# Patient Record
Sex: FEMALE | Race: BLACK OR AFRICAN AMERICAN | NOT HISPANIC OR LATINO | Employment: OTHER | URBAN - METROPOLITAN AREA
[De-identification: names, ages, dates, MRNs, and addresses within clinical notes are randomized per-mention and may not be internally consistent; named-entity substitution may affect disease eponyms.]

---

## 2023-01-01 ENCOUNTER — NURSING HOME VISIT (OUTPATIENT)
Dept: POST ACUTE CARE | Facility: EXTERNAL LOCATION | Age: 83
End: 2023-01-01
Payer: MEDICARE

## 2023-01-01 ENCOUNTER — HOSPITAL ENCOUNTER (EMERGENCY)
Facility: HOSPITAL | Age: 83
End: 2023-10-04
Attending: STUDENT IN AN ORGANIZED HEALTH CARE EDUCATION/TRAINING PROGRAM | Admitting: EMERGENCY MEDICINE
Payer: MEDICARE

## 2023-01-01 ENCOUNTER — DOCUMENTATION (OUTPATIENT)
Dept: POST ACUTE CARE | Facility: EXTERNAL LOCATION | Age: 83
End: 2023-01-01
Payer: MEDICARE

## 2023-01-01 VITALS
WEIGHT: 180 LBS | TEMPERATURE: 96.4 F | SYSTOLIC BLOOD PRESSURE: 106 MMHG | HEART RATE: 66 BPM | BODY MASS INDEX: 29.99 KG/M2 | HEIGHT: 65 IN | DIASTOLIC BLOOD PRESSURE: 80 MMHG | RESPIRATION RATE: 20 BRPM

## 2023-01-01 DIAGNOSIS — M72.6 NECROTIZING FASCIITIS (MULTI): ICD-10-CM

## 2023-01-01 DIAGNOSIS — N18.6 END STAGE RENAL DISEASE (MULTI): ICD-10-CM

## 2023-01-01 DIAGNOSIS — R53.1 WEAKNESS: Primary | ICD-10-CM

## 2023-01-01 DIAGNOSIS — R53.1 WEAKNESS: ICD-10-CM

## 2023-01-01 DIAGNOSIS — I15.9 SECONDARY HYPERTENSION: ICD-10-CM

## 2023-01-01 DIAGNOSIS — I10 HYPERTENSION, ESSENTIAL: ICD-10-CM

## 2023-01-01 DIAGNOSIS — D63.1 ANEMIA DUE TO CHRONIC KIDNEY DISEASE, UNSPECIFIED CKD STAGE: ICD-10-CM

## 2023-01-01 DIAGNOSIS — I46.9 CARDIAC ARREST (MULTI): Primary | ICD-10-CM

## 2023-01-01 DIAGNOSIS — F03.90 DEMENTIA, UNSPECIFIED DEMENTIA SEVERITY, UNSPECIFIED DEMENTIA TYPE, UNSPECIFIED WHETHER BEHAVIORAL, PSYCHOTIC, OR MOOD DISTURBANCE OR ANXIETY (MULTI): ICD-10-CM

## 2023-01-01 DIAGNOSIS — N18.6 END STAGE RENAL DISEASE (MULTI): Primary | ICD-10-CM

## 2023-01-01 DIAGNOSIS — N18.9 ANEMIA DUE TO CHRONIC KIDNEY DISEASE, UNSPECIFIED CKD STAGE: ICD-10-CM

## 2023-01-01 DIAGNOSIS — D63.1 ANEMIA DUE TO STAGE 3B CHRONIC KIDNEY DISEASE (MULTI): ICD-10-CM

## 2023-01-01 DIAGNOSIS — G93.41 METABOLIC ENCEPHALOPATHY: ICD-10-CM

## 2023-01-01 DIAGNOSIS — I15.9 SECONDARY HYPERTENSION: Primary | ICD-10-CM

## 2023-01-01 DIAGNOSIS — E46 MALNUTRITION, UNSPECIFIED TYPE (MULTI): ICD-10-CM

## 2023-01-01 DIAGNOSIS — N18.32 ANEMIA DUE TO STAGE 3B CHRONIC KIDNEY DISEASE (MULTI): ICD-10-CM

## 2023-01-01 DIAGNOSIS — M72.6 NECROTIZING FASCIITIS (MULTI): Primary | ICD-10-CM

## 2023-01-01 PROCEDURE — 96376 TX/PRO/DX INJ SAME DRUG ADON: CPT | Mod: XS | Performed by: EMERGENCY MEDICINE

## 2023-01-01 PROCEDURE — 99284 EMERGENCY DEPT VISIT MOD MDM: CPT | Performed by: STUDENT IN AN ORGANIZED HEALTH CARE EDUCATION/TRAINING PROGRAM

## 2023-01-01 PROCEDURE — 99308 SBSQ NF CARE LOW MDM 20: CPT | Performed by: INTERNAL MEDICINE

## 2023-01-01 PROCEDURE — 99305 1ST NF CARE MODERATE MDM 35: CPT | Performed by: INTERNAL MEDICINE

## 2023-01-01 PROCEDURE — 99308 SBSQ NF CARE LOW MDM 20: CPT | Performed by: NURSE PRACTITIONER

## 2023-01-01 PROCEDURE — 96374 THER/PROPH/DIAG INJ IV PUSH: CPT | Mod: XS | Performed by: EMERGENCY MEDICINE

## 2023-01-01 PROCEDURE — 31500 INSERT EMERGENCY AIRWAY: CPT | Performed by: STUDENT IN AN ORGANIZED HEALTH CARE EDUCATION/TRAINING PROGRAM

## 2023-01-01 PROCEDURE — 96375 TX/PRO/DX INJ NEW DRUG ADDON: CPT | Mod: XS | Performed by: EMERGENCY MEDICINE

## 2023-01-01 PROCEDURE — 99309 SBSQ NF CARE MODERATE MDM 30: CPT | Performed by: INTERNAL MEDICINE

## 2023-01-01 PROCEDURE — 2500000004 HC RX 250 GENERAL PHARMACY W/ HCPCS (ALT 636 FOR OP/ED): Performed by: STUDENT IN AN ORGANIZED HEALTH CARE EDUCATION/TRAINING PROGRAM

## 2023-01-01 PROCEDURE — 2500000005 HC RX 250 GENERAL PHARMACY W/O HCPCS: Performed by: STUDENT IN AN ORGANIZED HEALTH CARE EDUCATION/TRAINING PROGRAM

## 2023-01-01 PROCEDURE — 99285 EMERGENCY DEPT VISIT HI MDM: CPT | Mod: 25 | Performed by: EMERGENCY MEDICINE

## 2023-01-01 RX ORDER — EPINEPHRINE 0.1 MG/ML
INJECTION INTRACARDIAC; INTRAVENOUS CODE/TRAUMA/SEDATION MEDICATION
Status: COMPLETED | OUTPATIENT
Start: 2023-01-01 | End: 2023-01-01

## 2023-01-01 RX ORDER — INDOMETHACIN 25 MG/1
CAPSULE ORAL
Status: COMPLETED | OUTPATIENT
Start: 2023-01-01 | End: 2023-01-01

## 2023-01-01 RX ORDER — CALCIUM CHLORIDE INJECTION 100 MG/ML
INJECTION, SOLUTION INTRAVENOUS CODE/TRAUMA/SEDATION MEDICATION
Status: COMPLETED | OUTPATIENT
Start: 2023-01-01 | End: 2023-01-01

## 2023-01-01 RX ORDER — CALCIUM CHLORIDE INJECTION 100 MG/ML
INJECTION, SOLUTION INTRAVENOUS
Status: DISCONTINUED
Start: 2023-01-01 | End: 2023-01-01 | Stop reason: HOSPADM

## 2023-01-01 RX ADMIN — EPINEPHRINE 1 MG: 0.1 INJECTION INTRACARDIAC; INTRAVENOUS at 10:58

## 2023-01-01 RX ADMIN — EPINEPHRINE 1 MG: 0.1 INJECTION INTRACARDIAC; INTRAVENOUS at 11:01

## 2023-01-01 RX ADMIN — EPINEPHRINE 1 MG: 0.1 INJECTION INTRACARDIAC; INTRAVENOUS at 11:04

## 2023-01-01 RX ADMIN — CALCIUM CHLORIDE 1 G: 100 INJECTION INTRAVENOUS; INTRAVENTRICULAR at 11:03

## 2023-01-01 RX ADMIN — CALCIUM CHLORIDE 1 G: 100 INJECTION INTRAVENOUS; INTRAVENTRICULAR at 10:56

## 2023-01-01 RX ADMIN — SODIUM BICARBONATE 50 MEQ: 84 INJECTION, SOLUTION INTRAVENOUS at 11:03

## 2023-01-01 RX ADMIN — EPINEPHRINE 1 MG: 0.1 INJECTION INTRACARDIAC; INTRAVENOUS at 10:56

## 2023-01-01 ASSESSMENT — LIFESTYLE VARIABLES
EVER HAD A DRINK FIRST THING IN THE MORNING TO STEADY YOUR NERVES TO GET RID OF A HANGOVER: NO
EVER FELT BAD OR GUILTY ABOUT YOUR DRINKING: NO
HAVE YOU EVER FELT YOU SHOULD CUT DOWN ON YOUR DRINKING: NO
HAVE PEOPLE ANNOYED YOU BY CRITICIZING YOUR DRINKING: NO

## 2023-06-19 NOTE — LETTER
Patient: Afua Hilario  : 1940    Encounter Date: 2023    PROGRESS NOTE    Subjective  Chief complaint: Afua Hilario is a 83 y.o. female who is an acute skilled patient being seen and evaluated for weakness    HPI:  2023 patient admitted to skilled nursing facility for therapy and medical management after recent hospitalization.  She had presented to the ED with complaint of abdominal pain and gluteal pain.  Imaging concerning for necrotizing fasciitis.  Patient admitted to hospital and underwent debridement.  Fecal management system placed and patient started on IV antibiotics.  She was seen by infectious disease and transition to Augmentin which she will continue until 2023.      Objective  Vital signs: 123/64, 97.8, 93, 16, 97%    Physical Exam  Constitutional:       General: She is not in acute distress.  HENT:      Nose:      Comments: Corpak  Eyes:      Extraocular Movements: Extraocular movements intact.   Cardiovascular:      Rate and Rhythm: Normal rate and regular rhythm.      Arteriovenous access: Right arteriovenous access is present.     Comments: Right upper extremity AV fistula positive thrill positive bruit  Pulmonary:      Effort: Pulmonary effort is normal.      Breath sounds: Normal breath sounds.   Abdominal:      General: Bowel sounds are normal.      Palpations: Abdomen is soft.   Genitourinary:     Comments: Fecal management system intact  Musculoskeletal:      Cervical back: Neck supple.      Right lower leg: Edema present.      Left lower leg: Edema present.      Comments: Generalized weakness   Skin:     Comments: Wound VAC intact to peritoneal area  Dialysis port to the right chest   Neurological:      Mental Status: She is alert.   Psychiatric:         Mood and Affect: Mood normal.         Behavior: Behavior is cooperative.         Assessment/Plan  Problem List Items Addressed This Visit       End stage renal disease (CMS/[facility])     HD per nephrology  Renal  diet         Necrotizing fasciitis (CMS/HCC)     Antibiotics complete  Wound care per wound doctor         Secondary hypertension     Controlled  Continue antihypertensives  Monitor BP         Weakness - Primary     Therapy          Medications, treatments, and labs reviewed  Continue medications and treatments as listed in EMR    JOHN Garcia      Electronically Signed By: JOHN Garcia   6/25/23 12:36 PM

## 2023-06-19 NOTE — PROGRESS NOTES
PROGRESS NOTE    Subjective   Chief complaint: Afua Hilario is a 83 y.o. female who is an acute skilled patient being seen and evaluated for weakness    HPI:  6/19/2023 patient admitted to skilled nursing facility for therapy and medical management after recent hospitalization.  She had presented to the ED with complaint of abdominal pain and gluteal pain.  Imaging concerning for necrotizing fasciitis.  Patient admitted to hospital and underwent debridement.  Fecal management system placed and patient started on IV antibiotics.  She was seen by infectious disease and transition to Augmentin which she will continue until 6/16/2023.      Objective   Vital signs: 123/64, 97.8, 93, 16, 97%    Physical Exam  Constitutional:       General: She is not in acute distress.  HENT:      Nose:      Comments: Corpak  Eyes:      Extraocular Movements: Extraocular movements intact.   Cardiovascular:      Rate and Rhythm: Normal rate and regular rhythm.      Arteriovenous access: Right arteriovenous access is present.     Comments: Right upper extremity AV fistula positive thrill positive bruit  Pulmonary:      Effort: Pulmonary effort is normal.      Breath sounds: Normal breath sounds.   Abdominal:      General: Bowel sounds are normal.      Palpations: Abdomen is soft.   Genitourinary:     Comments: Fecal management system intact  Musculoskeletal:      Cervical back: Neck supple.      Right lower leg: Edema present.      Left lower leg: Edema present.      Comments: Generalized weakness   Skin:     Comments: Wound VAC intact to peritoneal area  Dialysis port to the right chest   Neurological:      Mental Status: She is alert.   Psychiatric:         Mood and Affect: Mood normal.         Behavior: Behavior is cooperative.         Assessment/Plan   Problem List Items Addressed This Visit       End stage renal disease (CMS/HCC)     HD per nephrology  Renal diet         Necrotizing fasciitis (CMS/HCC)     Antibiotics  complete  Wound care per wound doctor         Secondary hypertension     Controlled  Continue antihypertensives  Monitor BP         Weakness - Primary     Therapy          Medications, treatments, and labs reviewed  Continue medications and treatments as listed in EMR    Heather Sierra, APRN-CNP

## 2023-06-20 NOTE — LETTER
Patient: Afua Hilario  : 1940    Encounter Date: 2023    HISTORY & PHYSICAL    Subjective  Chief complaint: Afua Hilario is a 83 y.o. female who is a acute skilled care patient being seen and evaluated for multiple medical problems.  Patient presents for weakness.    HPI:  2023 patient admitted to skilled nursing facility for therapy and medical management after recent hospitalization.  She had presented to the ED with complaint of abdominal pain and gluteal pain.  Imaging concerning for necrotizing fasciitis.  Patient admitted to hospital and underwent debridement.  Fecal management system placed and patient started on IV antibiotics.  She was seen by infectious disease and transition to Augmentin which she will continue until 2023.  Patient end-stage renal disease on hemodialysis        Past Medical History:   Diagnosis Date   • Anemia    • ESRD (end stage renal disease) (CMS/[facility])    • GERD (gastroesophageal reflux disease)    • Hypertension, essential    • Malnutrition (CMS/[facility])    • RA (rheumatoid arthritis) (CMS/[facility])        No past surgical history on file.    Family History   Problem Relation Name Age of Onset   • No Known Problems Mother     • No Known Problems Father         Social History     Socioeconomic History   • Marital status:      Spouse name: Not on file   • Number of children: Not on file   • Years of education: Not on file   • Highest education level: Not on file   Occupational History   • Not on file   Tobacco Use   • Smoking status: Not on file   • Smokeless tobacco: Not on file   Substance and Sexual Activity   • Alcohol use: Not on file   • Drug use: Not on file   • Sexual activity: Not on file   Other Topics Concern   • Not on file   Social History Narrative   • Not on file     Social Determinants of Health     Financial Resource Strain: Not on file   Food Insecurity: Not on file   Transportation Needs: Not on file   Physical Activity: Not on file    Stress: Not on file   Social Connections: Not on file   Intimate Partner Violence: Not on file   Housing Stability: Not on file       Vital signs: 128/64, 97.5, 71, 16, 97%    Objective  Physical Exam  Vitals reviewed.   Constitutional:       Appearance: Normal appearance.   HENT:      Head: Normocephalic and atraumatic.   Cardiovascular:      Rate and Rhythm: Normal rate and regular rhythm.   Pulmonary:      Effort: Pulmonary effort is normal.      Breath sounds: Normal breath sounds.   Abdominal:      General: Bowel sounds are normal.      Palpations: Abdomen is soft.      Comments: Patient had a wound VAC to the wound in the.  Area where she had fasciitis and necrotizing fasciitis.  She has a fecal system management.   Musculoskeletal:      Cervical back: Neck supple.   Skin:     General: Skin is warm and dry.   Neurological:      General: No focal deficit present.      Mental Status: She is alert.   Psychiatric:         Mood and Affect: Mood normal.         Behavior: Behavior is cooperative.     Anemia    Assessment/Plan  Problem List Items Addressed This Visit    None    Medications, treatments, and labs reviewed  Continue medications and treatments as listed in [facility]    Scribe Attestation  I, Melissa Barajas   attest that this documentation has been prepared under the direction and in the presence of Sridevi Mitchell MD.    Provider Attestation - Scribe documentation  All medical record entries made by the Scribe were at my direction and personally dictated by me. I have reviewed the chart and agree that the record accurately reflects my personal performance of the history, physical exam, discussion and plan.    Sridevi Mitchell MD          Electronically Signed By: Sridevi Mitchell MD   6/21/23  7:44 PM

## 2023-06-21 PROBLEM — N18.9 ANEMIA DUE TO CHRONIC KIDNEY DISEASE: Status: ACTIVE | Noted: 2023-01-01

## 2023-06-21 PROBLEM — D63.1 ANEMIA DUE TO CHRONIC KIDNEY DISEASE: Status: ACTIVE | Noted: 2023-01-01

## 2023-06-21 PROBLEM — R53.1 WEAKNESS: Status: ACTIVE | Noted: 2023-01-01

## 2023-06-21 PROBLEM — I15.9 SECONDARY HYPERTENSION: Status: ACTIVE | Noted: 2023-01-01

## 2023-06-21 PROBLEM — N18.6 END STAGE RENAL DISEASE (MULTI): Status: ACTIVE | Noted: 2023-01-01

## 2023-06-21 PROBLEM — M72.6 NECROTIZING FASCIITIS (MULTI): Status: ACTIVE | Noted: 2023-01-01

## 2023-06-21 NOTE — LETTER
Patient: Afau Hilario  : 1940    Encounter Date: 2023    PROGRESS NOTE    Subjective  Chief complaint: Afua Hilario is a 83 y.o. female who is an acute skilled patient being seen and evaluated for weakness    HPI:  2023 patient admitted to skilled nursing facility for therapy and medical management after recent hospitalization.  She had presented to the ED with complaint of abdominal pain and gluteal pain.  Imaging concerning for necrotizing fasciitis.  Patient admitted to hospital and underwent debridement.  Fecal management system placed and patient started on IV antibiotics.  She was seen by infectious disease and transition to Augmentin which she will continue until 2023.    2023 Patient at skilled nursing [facility] for therapy.  Patient has been actively participating in therapy and continues to work toward goals.  Patient requires minimal to moderate assist for sit to stand, supine to sit, and stand pivot transfers.  Patient has no new concerns today.  Feeling OK.  Denies n/v/f/c.  No new concerns per nursing staff.         Objective  Vital signs: 128/64, 97.5, 71, 16, 97%    Physical Exam  Constitutional:       General: She is not in acute distress.  Eyes:      Extraocular Movements: Extraocular movements intact.   Cardiovascular:      Rate and Rhythm: Normal rate and regular rhythm.      Arteriovenous access: Right arteriovenous access is present.     Comments: Right upper extremity AV fistula positive thrill positive bruit  Pulmonary:      Effort: Pulmonary effort is normal.      Breath sounds: Normal breath sounds.   Abdominal:      General: Bowel sounds are normal.      Palpations: Abdomen is soft.   Genitourinary:     Comments: Fecal management system intact  Musculoskeletal:      Cervical back: Neck supple.      Right lower leg: Edema present.      Left lower leg: Edema present.      Comments: Generalized weakness   Skin:     Comments: Wound VAC intact to peritoneal  area  Dialysis port to the right chest   Neurological:      Mental Status: She is alert.   Psychiatric:         Mood and Affect: Mood normal.         Behavior: Behavior is cooperative.         Assessment/Plan  Problem List Items Addressed This Visit       End stage renal disease (CMS/HCC)     HD per nephrology  Renal diet         Necrotizing fasciitis (CMS/[facility])     Antibiotics complete  Wound care per wound doctor         Secondary hypertension     Controlled  Continue antihypertensives  Monitor BP         Weakness - Primary     Continue with therapy          Medications, treatments, and labs reviewed  Continue medications and treatments as listed in EMR    JOHN Garcia      Electronically Signed By: JOHN Garcia   6/25/23 12:45 PM

## 2023-06-21 NOTE — PROGRESS NOTES
HISTORY & PHYSICAL    Subjective   Chief complaint: Afua Hilario is a 83 y.o. female who is a acute skilled care patient being seen and evaluated for multiple medical problems.  Patient presents for weakness.    HPI:  6/19/2023 patient admitted to skilled nursing facility for therapy and medical management after recent hospitalization.  She had presented to the ED with complaint of abdominal pain and gluteal pain.  Imaging concerning for necrotizing fasciitis.  Patient admitted to hospital and underwent debridement.  Fecal management system placed and patient started on IV antibiotics.  She was seen by infectious disease and transition to Augmentin which she will continue until 6/16/2023.  Patient end-stage renal disease on hemodialysis        Past Medical History:   Diagnosis Date    Anemia     ESRD (end stage renal disease) (CMS/[facility])     GERD (gastroesophageal reflux disease)     Hypertension, essential     Malnutrition (CMS/[facility])     RA (rheumatoid arthritis) (CMS/[facility])        No past surgical history on file.    Family History   Problem Relation Name Age of Onset    No Known Problems Mother      No Known Problems Father         Social History     Socioeconomic History    Marital status:      Spouse name: Not on file    Number of children: Not on file    Years of education: Not on file    Highest education level: Not on file   Occupational History    Not on file   Tobacco Use    Smoking status: Not on file    Smokeless tobacco: Not on file   Substance and Sexual Activity    Alcohol use: Not on file    Drug use: Not on file    Sexual activity: Not on file   Other Topics Concern    Not on file   Social History Narrative    Not on file     Social Determinants of Health     Financial Resource Strain: Not on file   Food Insecurity: Not on file   Transportation Needs: Not on file   Physical Activity: Not on file   Stress: Not on file   Social Connections: Not on file   Intimate Partner Violence: Not on file    Housing Stability: Not on file       Vital signs: 128/64, 97.5, 71, 16, 97%    Objective   Physical Exam  Vitals reviewed.   Constitutional:       Appearance: Normal appearance.   HENT:      Head: Normocephalic and atraumatic.   Cardiovascular:      Rate and Rhythm: Normal rate and regular rhythm.   Pulmonary:      Effort: Pulmonary effort is normal.      Breath sounds: Normal breath sounds.   Abdominal:      General: Bowel sounds are normal.      Palpations: Abdomen is soft.      Comments: Patient had a wound VAC to the wound in the.  Area where she had fasciitis and necrotizing fasciitis.  She has a fecal system management.   Musculoskeletal:      Cervical back: Neck supple.   Skin:     General: Skin is warm and dry.   Neurological:      General: No focal deficit present.      Mental Status: She is alert.   Psychiatric:         Mood and Affect: Mood normal.         Behavior: Behavior is cooperative.     Anemia    Assessment/Plan   Problem List Items Addressed This Visit    None    Medications, treatments, and labs reviewed  Continue medications and treatments as listed in [facility]    Scribe Attestation  I, Melissa Barajas   attest that this documentation has been prepared under the direction and in the presence of Sridevi Mitchell MD.    Provider Attestation - Scribe documentation  All medical record entries made by the Scribe were at my direction and personally dictated by me. I have reviewed the chart and agree that the record accurately reflects my personal performance of the history, physical exam, discussion and plan.    Sridevi Mitchell MD

## 2023-06-22 NOTE — PROGRESS NOTES
PROGRESS NOTE    Subjective   Chief complaint: Afua Hilario is a 83 y.o. female who is an acute skilled patient being seen and evaluated for weakness    HPI:  6/19/2023 patient admitted to skilled nursing facility for therapy and medical management after recent hospitalization.  She had presented to the ED with complaint of abdominal pain and gluteal pain.  Imaging concerning for necrotizing fasciitis.  Patient admitted to hospital and underwent debridement.  Fecal management system placed and patient started on IV antibiotics.  She was seen by infectious disease and transition to Augmentin which she will continue until 6/16/2023.    6/21/2023 Patient at [facility] nursing [facility] for therapy.  Patient has been actively participating in therapy and continues to work toward goals.  Patient requires minimal to moderate assist for sit to stand, supine to sit, and stand pivot transfers.  Patient has no new concerns today.  Feeling OK.  Denies n/v/f/c.  No new concerns per nursing staff.       6/22/2023 Patient presents for fu therapy and general medical care.  Patient has been working with therapy d/t generalized weakness.  Patient requires minimal to moderate assist for sit to supine, sit to stand, and stand pivot transfers.  She is able to ambulate short distances in room with front wheeled walker with moderate assist.  Tolerating therapy sessions.  Continues to work toward goals.  Patient has no new concerns today.  Feeling OK.  Denies n/v/f/c.         Objective   Vital signs: 132/68, 97.3, 88, 16, 97%    Physical Exam  Constitutional:       General: She is not in acute distress.  Eyes:      Extraocular Movements: Extraocular movements intact.   Cardiovascular:      Rate and Rhythm: Normal rate and regular rhythm.   Pulmonary:      Effort: Pulmonary effort is normal.      Breath sounds: Normal breath sounds.   Musculoskeletal:      Cervical back: Neck supple.      Comments: Generalized weakness   Neurological:       Mental Status: She is alert.   Psychiatric:         Mood and Affect: Mood normal.         Behavior: Behavior is cooperative.         Assessment/Plan   Problem List Items Addressed This Visit       End stage renal disease (CMS/[facility])     HD per nephrology  Renal diet         Necrotizing fasciitis (CMS/[facility])     Antibiotics complete  Wound care per wound doctor         Secondary hypertension     Controlled  Continue antihypertensives  Monitor BP         Weakness - Primary     Continue therapy          Medications, treatments, and labs reviewed  Continue medications and treatments as listed in EMR    Heather Sierra, BUBBA-CNP

## 2023-06-22 NOTE — PROGRESS NOTES
PROGRESS NOTE    Subjective   Chief complaint: Afua Hilario is a 83 y.o. female who is an acute skilled patient being seen and evaluated for weakness    HPI:  6/19/2023 patient admitted to skilled nursing facility for therapy and medical management after recent hospitalization.  She had presented to the ED with complaint of abdominal pain and gluteal pain.  Imaging concerning for necrotizing fasciitis.  Patient admitted to hospital and underwent debridement.  Fecal management system placed and patient started on IV antibiotics.  She was seen by infectious disease and transition to Augmentin which she will continue until 6/16/2023.    6/21/2023 Patient at skilled nursing facility for therapy.  Patient has been actively participating in therapy and continues to work toward goals.  Patient requires minimal to moderate assist for sit to stand, supine to sit, and stand pivot transfers.  Patient has no new concerns today.  Feeling OK.  Denies n/v/f/c.  No new concerns per nursing staff.         Objective   Vital signs: 128/64, 97.5, 71, 16, 97%    Physical Exam  Constitutional:       General: She is not in acute distress.  Eyes:      Extraocular Movements: Extraocular movements intact.   Cardiovascular:      Rate and Rhythm: Normal rate and regular rhythm.      Arteriovenous access: Right arteriovenous access is present.     Comments: Right upper extremity AV fistula positive thrill positive bruit  Pulmonary:      Effort: Pulmonary effort is normal.      Breath sounds: Normal breath sounds.   Abdominal:      General: Bowel sounds are normal.      Palpations: Abdomen is soft.   Genitourinary:     Comments: Fecal management system intact  Musculoskeletal:      Cervical back: Neck supple.      Right lower leg: Edema present.      Left lower leg: Edema present.      Comments: Generalized weakness   Skin:     Comments: Wound VAC intact to peritoneal area  Dialysis port to the right chest   Neurological:      Mental  Status: She is alert.   Psychiatric:         Mood and Affect: Mood normal.         Behavior: Behavior is cooperative.         Assessment/Plan   Problem List Items Addressed This Visit       End stage renal disease (CMS/[facility])     HD per nephrology  Renal diet         Necrotizing fasciitis (CMS/[facility])     Antibiotics complete  Wound care per wound doctor         Secondary hypertension     Controlled  Continue antihypertensives  Monitor BP         Weakness - Primary     Continue with therapy          Medications, treatments, and labs reviewed  Continue medications and treatments as listed in EMR    Heather Sierra, APRN-CNP

## 2023-06-22 NOTE — LETTER
Patient: Afua Hilario  : 1940    Encounter Date: 2023    PROGRESS NOTE    Subjective  Chief complaint: Afua Hilario is a 83 y.o. female who is an acute skilled patient being seen and evaluated for weakness    HPI:  2023 patient admitted to skilled nursing facility for therapy and medical management after recent hospitalization.  She had presented to the ED with complaint of abdominal pain and gluteal pain.  Imaging concerning for necrotizing fasciitis.  Patient admitted to hospital and underwent debridement.  Fecal management system placed and patient started on IV antibiotics.  She was seen by infectious disease and transition to Augmentin which she will continue until 2023.    2023 Patient at [facility] for therapy.  Patient has been actively participating in therapy and continues to work toward goals.  Patient requires minimal to moderate assist for sit to stand, supine to sit, and stand pivot transfers.  Patient has no new concerns today.  Feeling OK.  Denies n/v/f/c.  No new concerns per nursing staff.       2023 Patient presents for fu therapy and general medical care.  Patient has been working with therapy d/t generalized weakness.  Patient requires minimal to moderate assist for sit to supine, sit to stand, and stand pivot transfers.  She is able to ambulate short distances in room with front wheeled walker with moderate assist.  Tolerating therapy sessions.  Continues to work toward goals.  Patient has no new concerns today.  Feeling OK.  Denies n/v/f/c.         Objective  Vital signs: 132/68, 97.3, 88, 16, 97%    Physical Exam  Constitutional:       General: She is not in acute distress.  Eyes:      Extraocular Movements: Extraocular movements intact.   Cardiovascular:      Rate and Rhythm: Normal rate and regular rhythm.   Pulmonary:      Effort: Pulmonary effort is normal.      Breath sounds: Normal breath sounds.   Musculoskeletal:      Cervical  back: Neck supple.      Comments: Generalized weakness   Neurological:      Mental Status: She is alert.   Psychiatric:         Mood and Affect: Mood normal.         Behavior: Behavior is cooperative.         Assessment/Plan  Problem List Items Addressed This Visit       End stage renal disease (CMS/[facility])     HD per nephrology  Renal diet         Necrotizing fasciitis (CMS/[facility])     Antibiotics complete  Wound care per wound doctor         Secondary hypertension     Controlled  Continue antihypertensives  Monitor BP         Weakness - Primary     Continue therapy          Medications, treatments, and labs reviewed  Continue medications and treatments as listed in EMR    JOHN Garcia      Electronically Signed By: JOHN Garcia   6/22/23  7:38 PM

## 2023-06-23 NOTE — LETTER
Patient: Afua Hilario  : 1940    Encounter Date: 2023    PROGRESS NOTE    Subjective  Chief complaint: Afua Hilario is a 83 y.o. female who is an acute skilled patient being seen and evaluated for weakness    HPI:  2023 patient admitted to skilled nursing facility for therapy and medical management after recent hospitalization.  She had presented to the ED with complaint of abdominal pain and gluteal pain.  Imaging concerning for necrotizing fasciitis.  Patient admitted to hospital and underwent debridement.  Fecal management system placed and patient started on IV antibiotics.  She was seen by infectious disease and transition to Augmentin which she will continue until 2023.    2023 Patient at [facility] nursing [facility] for therapy.  Patient has been actively participating in therapy and continues to work toward goals.  Patient requires minimal to moderate assist for sit to stand, supine to sit, and stand pivot transfers.  Patient has no new concerns today.  Feeling OK.  Denies n/v/f/c.  No new concerns per nursing staff.       2023 Patient presents for fu therapy and general medical care.  Patient has been working with therapy d/t generalized weakness.  Patient requires minimal to moderate assist for sit to supine, sit to stand, and stand pivot transfers.  She is able to ambulate short distances in room with front wheeled walker with moderate assist.  Tolerating therapy sessions.  Continues to work toward goals.  Patient has no new concerns today.  Feeling OK.  Denies n/v/f/c.       23   Patient continues to work in therapy.  Patient requires minimal to moderate assistance for transfers.    Patient is also ambulating short distances within her room with front wheeled walker and moderate assistance. No new issues or concerns.  No acute distress.      Objective  Vital signs: 134/67    Physical Exam  Constitutional:       General: She is not in acute distress.  Eyes:       Extraocular Movements: Extraocular movements intact.   Cardiovascular:      Rate and Rhythm: Normal rate and regular rhythm.   Pulmonary:      Effort: Pulmonary effort is normal.      Breath sounds: Normal breath sounds.   Abdominal:      General: Bowel sounds are normal.      Palpations: Abdomen is soft.   Musculoskeletal:      Cervical back: Neck supple.      Right lower leg: No edema.      Left lower leg: No edema.   Neurological:      Mental Status: She is alert.   Psychiatric:         Mood and Affect: Mood normal.         Behavior: Behavior is cooperative.         Assessment/Plan  Problem List Items Addressed This Visit          Circulatory    Secondary hypertension     Controlled  Continue antihypertensives  Monitor BP            Other    Weakness     Continue therapy          Medications, treatments, and labs reviewed  Continue medications and treatments as listed in PCC    Sridevi Mitchell MD    1. Secondary hypertension        2. Weakness             Scribe Attestation  By signing my name below, IKimberlee Scribe   attest that this documentation has been prepared under the direction and in the presence of Sridevi Mitchell MD.    Provider Attestation - Scribe documentation  All medical record entries made by the Scribe were at my direction and personally dictated by me. I have reviewed the chart and agree that the record accurately reflects my personal performance of the history, physical exam, discussion and plan.      Electronically Signed By: Sridevi Mitchell MD   6/23/23  5:38 PM

## 2023-06-23 NOTE — PROGRESS NOTES
PROGRESS NOTE    Subjective   Chief complaint: Afua Hilario is a 83 y.o. female who is an acute skilled patient being seen and evaluated for weakness    HPI:  6/19/2023 patient admitted to skilled nursing facility for therapy and medical management after recent hospitalization.  She had presented to the ED with complaint of abdominal pain and gluteal pain.  Imaging concerning for necrotizing fasciitis.  Patient admitted to hospital and underwent debridement.  Fecal management system placed and patient started on IV antibiotics.  She was seen by infectious disease and transition to Augmentin which she will continue until 6/16/2023.    6/21/2023 Patient at [facility] nursing [facility] for therapy.  Patient has been actively participating in therapy and continues to work toward goals.  Patient requires minimal to moderate assist for sit to stand, supine to sit, and stand pivot transfers.  Patient has no new concerns today.  Feeling OK.  Denies n/v/f/c.  No new concerns per nursing staff.       6/22/2023 Patient presents for fu therapy and general medical care.  Patient has been working with therapy d/t generalized weakness.  Patient requires minimal to moderate assist for sit to supine, sit to stand, and stand pivot transfers.  She is able to ambulate short distances in room with front wheeled walker with moderate assist.  Tolerating therapy sessions.  Continues to work toward goals.  Patient has no new concerns today.  Feeling OK.  Denies n/v/f/c.       6/23/23   Patient continues to work in therapy.  Patient requires minimal to moderate assistance for transfers.    Patient is also ambulating short distances within her room with front wheeled walker and moderate assistance. No new issues or concerns.  No acute distress.      Objective   Vital signs: 134/67    Physical Exam  Constitutional:       General: She is not in acute distress.  Eyes:      Extraocular Movements: Extraocular movements intact.   Cardiovascular:       Rate and Rhythm: Normal rate and regular rhythm.   Pulmonary:      Effort: Pulmonary effort is normal.      Breath sounds: Normal breath sounds.   Abdominal:      General: Bowel sounds are normal.      Palpations: Abdomen is soft.   Musculoskeletal:      Cervical back: Neck supple.      Right lower leg: No edema.      Left lower leg: No edema.   Neurological:      Mental Status: She is alert.   Psychiatric:         Mood and Affect: Mood normal.         Behavior: Behavior is cooperative.         Assessment/Plan   Problem List Items Addressed This Visit          Circulatory    Secondary hypertension     Controlled  Continue antihypertensives  Monitor BP            Other    Weakness     Continue therapy          Medications, treatments, and labs reviewed  Continue medications and treatments as listed in PCC    Sridevi Mitchell MD    1. Secondary hypertension        2. Weakness             Scribe Attestation  By signing my name below, I, Melissa Booth   attest that this documentation has been prepared under the direction and in the presence of Sridevi Mitchell MD.    Provider Attestation - Scribe documentation  All medical record entries made by the Scribe were at my direction and personally dictated by me. I have reviewed the chart and agree that the record accurately reflects my personal performance of the history, physical exam, discussion and plan.

## 2023-06-26 NOTE — LETTER
Patient: Afua Hilario  : 1940    Encounter Date: 2023    PROGRESS NOTE    Subjective  Chief complaint: Afua Hilario is a 83 y.o. female who is an acute skilled patient being seen and evaluated for weakness    HPI:  2023 patient admitted to skilled nursing facility for therapy and medical management after recent hospitalization.  She had presented to the ED with complaint of abdominal pain and gluteal pain.  Imaging concerning for necrotizing fasciitis.  Patient admitted to hospital and underwent debridement.  Fecal management system placed and patient started on IV antibiotics.  She was seen by infectious disease and transition to Augmentin which she will continue until 2023.    2023 Patient at [facility] nursing [facility] for therapy.  Patient has been actively participating in therapy and continues to work toward goals.  Patient requires minimal to moderate assist for sit to stand, supine to sit, and stand pivot transfers.  Patient has no new concerns today.  Feeling OK.  Denies n/v/f/c.  No new concerns per nursing staff.       2023 Patient presents for fu therapy and general medical care.  Patient has been working with therapy d/t generalized weakness.  Patient requires minimal to moderate assist for sit to supine, sit to stand, and stand pivot transfers.  She is able to ambulate short distances in room with front wheeled walker with moderate assist.  Tolerating therapy sessions.  Continues to work toward goals.  Patient has no new concerns today.  Feeling OK.  Denies n/v/f/c.       23   Patient continues to work in therapy.  Patient requires minimal to moderate assistance for transfers.    Patient is also ambulating short distances within her room with front wheeled walker and moderate assistance. No new issues or concerns.  No acute distress.    2023 Patient progressing in therapy.  She is able to walk 10 feet with rollator walker with minimal assist.   Tolerating dialysis.  No new concerns at this time.  Denies n/v/f/c.      Objective  Vital signs: 174/93    Physical Exam  Constitutional:       General: She is not in acute distress.  Eyes:      Extraocular Movements: Extraocular movements intact.   Cardiovascular:      Rate and Rhythm: Normal rate and regular rhythm.   Pulmonary:      Effort: Pulmonary effort is normal.      Breath sounds: Normal breath sounds.   Musculoskeletal:      Cervical back: Neck supple.      Comments: Generalized weakness   Skin:     Comments: Wound vac to peritoneal area   Neurological:      Mental Status: She is alert.   Psychiatric:         Behavior: Behavior is cooperative.         Assessment/Plan  Problem List Items Addressed This Visit       End stage renal disease (CMS/HCC)     HD per nephrology  Renal diet         Necrotizing fasciitis (CMS/[facility])     Antibiotics complete  Wound care per wound doctor         Secondary hypertension     BP usually in the 120s systolic  Continue antihypertensives  Monitor BP         Weakness - Primary     Continue with therapy          Medications, treatments, and labs reviewed  Continue medications and treatments as listed in EMR    JOHN Garcia      Electronically Signed By: JOHN Garcia   7/2/23  4:25 PM

## 2023-06-29 NOTE — LETTER
Patient: Afua Hilario  : 1940    Encounter Date: 2023    PROGRESS NOTE    Subjective  Chief complaint: Afua Hilario is a 83 y.o. female who is a acute skilled care patient being seen and evaluated for weakness.    HPI:  2023 patient admitted to skilled nursing facility for therapy and medical management after recent hospitalization.  She had presented to the ED with complaint of abdominal pain and gluteal pain.  Imaging concerning for necrotizing fasciitis.  Patient admitted to hospital and underwent debridement.  Fecal management system placed and patient started on IV antibiotics.  She was seen by infectious disease and transition to Augmentin which she will continue until 2023.    2023 Patient at [facility] nursing [facility] for therapy.  Patient has been actively participating in therapy and continues to work toward goals.  Patient requires minimal to moderate assist for sit to stand, supine to sit, and stand pivot transfers.  Patient has no new concerns today.  Feeling OK.  Denies n/v/f/c.  No new concerns per nursing staff.       2023 Patient presents for fu therapy and general medical care.  Patient has been working with therapy d/t generalized weakness.  Patient requires minimal to moderate assist for sit to supine, sit to stand, and stand pivot transfers.  She is able to ambulate short distances in room with front wheeled walker with moderate assist.  Tolerating therapy sessions.  Continues to work toward goals.  Patient has no new concerns today.  Feeling OK.  Denies n/v/f/c.       23   Patient continues to work in therapy.  Patient requires minimal to moderate assistance for transfers.    Patient is also ambulating short distances within her room with front wheeled walker and moderate assistance. No new issues or concerns.  No acute distress.     2023   Patient in therapy d/t generalized weakness.  Patient presents for f/u.  Continues to work toward goals  in therapy.    Patient is stable without complaint.  No new concerns reported by nursing.      Objective  Vital signs:   150/95, 98.0, 100, 18, 95%  Physical Exam  Constitutional:       General: She is not in acute distress.  Eyes:      Extraocular Movements: Extraocular movements intact.   Cardiovascular:      Rate and Rhythm: Normal rate and regular rhythm.      Arteriovenous access: Right arteriovenous access is present.     Comments: Right upper extremity AV fistula positive thrill positive bruit  Pulmonary:      Effort: Pulmonary effort is normal.      Breath sounds: Normal breath sounds.   Musculoskeletal:      Cervical back: Neck supple.      Comments: Generalized weakness   Skin:     Comments: Wound VAC to peritoneal area  Dialysis port to the right chest   Neurological:      Mental Status: She is alert.   Psychiatric:         Mood and Affect: Mood normal.         Behavior: Behavior is cooperative.         Assessment/Plan  Problem List Items Addressed This Visit       End stage renal disease (CMS/HCC)     HD per nephrology  Renal diet         Necrotizing fasciitis (CMS/[facility])     Antibiotics complete  Wound care per wound doctor         Secondary hypertension     Controlled  Continue antihypertensives  Monitor BP         Weakness - Primary     Continue therapy          Medications, treatments, and labs reviewed  Continue medications and treatments as listed in EMR    Scribe Attestation  IChristi Scribe   attest that this documentation has been prepared under the direction and in the presence of JOHN Garcia    Provider Attestation - Scribe documentation  All medical record entries made by the Scribe were at my direction and personally dictated by me. I have reviewed the chart and agree that the record accurately reflects my personal performance of the history, physical exam, discussion and plan.   JOHN Garcia        Electronically Signed By: JOHN Garcia    7/1/23  8:40 PM

## 2023-06-30 NOTE — LETTER
Patient: Afua Hilario  : 1940    Encounter Date: 2023    PROGRESS NOTE    Subjective  Chief complaint: Afua Hilario is a 83 y.o. female who is a acute skilled care patient being seen and evaluated for weakness.    HPI:  2023 patient admitted to skilled nursing facility for therapy and medical management after recent hospitalization.  She had presented to the ED with complaint of abdominal pain and gluteal pain.  Imaging concerning for necrotizing fasciitis.  Patient admitted to hospital and underwent debridement.  Fecal management system placed and patient started on IV antibiotics.  She was seen by infectious disease and transition to Augmentin which she will continue until 2023.    2023 Patient at skilled nursing [facility] for therapy.  Patient has been actively participating in therapy and continues to work toward goals.  Patient requires minimal to moderate assist for sit to stand, supine to sit, and stand pivot transfers.  Patient has no new concerns today.  Feeling OK.  Denies n/v/f/c.  No new concerns per nursing staff.       2023 Patient presents for fu therapy and general medical care.  Patient has been working with therapy d/t generalized weakness.  Patient requires minimal to moderate assist for sit to supine, sit to stand, and stand pivot transfers.  She is able to ambulate short distances in room with front wheeled walker with moderate assist.  Tolerating therapy sessions.  Continues to work toward goals.  Patient has no new concerns today.  Feeling OK.  Denies n/v/f/c.       23   Patient continues to work in therapy.  Patient requires minimal to moderate assistance for transfers.    Patient is also ambulating short distances within her room with front wheeled walker and moderate assistance. No new issues or concerns.  No acute distress.     2023   Patient in therapy d/t generalized weakness.  Patient presents for f/u.  Continues to work toward goals  in therapy.    Patient is stable without complaint.  No new concerns reported by nursing.    6/30/23 Patient working in therapy due to weakness and debility. She ambulates short distances with walker and moderate assistance. No acute distress. Denies chest pain or headache.       Objective  Vital signs:   150/95, 98.0, 100, 18, 95%  Physical Exam  Constitutional:       General: She is not in acute distress.  Eyes:      Extraocular Movements: Extraocular movements intact.   Cardiovascular:      Rate and Rhythm: Normal rate and regular rhythm.      Arteriovenous access: Right arteriovenous access is present.     Comments: Right upper extremity AV fistula positive thrill positive bruit  Pulmonary:      Effort: Pulmonary effort is normal.      Breath sounds: Normal breath sounds.   Abdominal:      General: Bowel sounds are normal.      Palpations: Abdomen is soft.   Genitourinary:     Comments: Fecal management system  Musculoskeletal:      Cervical back: Neck supple.      Right lower leg: Edema present.      Comments: Generalized weakness   Skin:     Comments: Wound VAC to peritoneal area  Dialysis port to the right chest   Neurological:      Mental Status: She is alert.   Psychiatric:         Mood and Affect: Mood normal.         Behavior: Behavior is cooperative.         Assessment/Plan  Problem List Items Addressed This Visit          Cardiac and Vasculature    Secondary hypertension     Controlled  Continue antihypertensives  Monitor BP            Symptoms and Signs    Weakness     Continue therapy        Medications, treatments, and labs reviewed  Continue medications and treatments as listed in EMR    Scribe Attestation  I, Melissa Booth   attest that this documentation has been prepared under the direction and in the presence of Sridevi Mitchell MD    Provider Attestation - Scribe documentation  All medical record entries made by the Scribe were at my direction and personally dictated by me. I have  reviewed the chart and agree that the record accurately reflects my personal performance of the history, physical exam, discussion and plan.   Sridevi Mitchell MD        Electronically Signed By: Sridevi Mitchell MD   6/30/23  5:54 PM

## 2023-06-30 NOTE — PROGRESS NOTES
PROGRESS NOTE    Subjective   Chief complaint: Afua Hilario is a 83 y.o. female who is a acute skilled care patient being seen and evaluated for weakness.    HPI:  6/19/2023 patient admitted to skilled nursing facility for therapy and medical management after recent hospitalization.  She had presented to the ED with complaint of abdominal pain and gluteal pain.  Imaging concerning for necrotizing fasciitis.  Patient admitted to hospital and underwent debridement.  Fecal management system placed and patient started on IV antibiotics.  She was seen by infectious disease and transition to Augmentin which she will continue until 6/16/2023.    6/21/2023 Patient at [facility] nursing [facility] for therapy.  Patient has been actively participating in therapy and continues to work toward goals.  Patient requires minimal to moderate assist for sit to stand, supine to sit, and stand pivot transfers.  Patient has no new concerns today.  Feeling OK.  Denies n/v/f/c.  No new concerns per nursing staff.       6/22/2023 Patient presents for fu therapy and general medical care.  Patient has been working with therapy d/t generalized weakness.  Patient requires minimal to moderate assist for sit to supine, sit to stand, and stand pivot transfers.  She is able to ambulate short distances in room with front wheeled walker with moderate assist.  Tolerating therapy sessions.  Continues to work toward goals.  Patient has no new concerns today.  Feeling OK.  Denies n/v/f/c.       6/23/23   Patient continues to work in therapy.  Patient requires minimal to moderate assistance for transfers.    Patient is also ambulating short distances within her room with front wheeled walker and moderate assistance. No new issues or concerns.  No acute distress.     6/29/2023   Patient in therapy d/t generalized weakness.  Patient presents for f/u.  Continues to work toward goals in therapy.    Patient is stable without complaint.  No new concerns  reported by nursing.    6/30/23 Patient working in therapy due to weakness and debility. She ambulates short distances with walker and moderate assistance. No acute distress. Denies chest pain or headache.       Objective   Vital signs:   150/95, 98.0, 100, 18, 95%  Physical Exam  Constitutional:       General: She is not in acute distress.  Eyes:      Extraocular Movements: Extraocular movements intact.   Cardiovascular:      Rate and Rhythm: Normal rate and regular rhythm.      Arteriovenous access: Right arteriovenous access is present.     Comments: Right upper extremity AV fistula positive thrill positive bruit  Pulmonary:      Effort: Pulmonary effort is normal.      Breath sounds: Normal breath sounds.   Abdominal:      General: Bowel sounds are normal.      Palpations: Abdomen is soft.   Genitourinary:     Comments: Fecal management system  Musculoskeletal:      Cervical back: Neck supple.      Right lower leg: Edema present.      Comments: Generalized weakness   Skin:     Comments: Wound VAC to peritoneal area  Dialysis port to the right chest   Neurological:      Mental Status: She is alert.   Psychiatric:         Mood and Affect: Mood normal.         Behavior: Behavior is cooperative.         Assessment/Plan   Problem List Items Addressed This Visit          Cardiac and Vasculature    Secondary hypertension     Controlled  Continue antihypertensives  Monitor BP            Symptoms and Signs    Weakness     Continue therapy        Medications, treatments, and labs reviewed  Continue medications and treatments as listed in EMR    Scribe Attestation  I, Sesar Boothiblaura   attest that this documentation has been prepared under the direction and in the presence of Sridevi Mitchell MD    Provider Attestation - Scribe documentation  All medical record entries made by the Scribe were at my direction and personally dictated by me. I have reviewed the chart and agree that the record accurately reflects my  personal performance of the history, physical exam, discussion and plan.   Sridevi Mitchell MD

## 2023-06-30 NOTE — PROGRESS NOTES
PROGRESS NOTE    Subjective   Chief complaint: Afua Hilario is a 83 y.o. female who is a acute skilled care patient being seen and evaluated for weakness.    HPI:  6/19/2023 patient admitted to skilled nursing facility for therapy and medical management after recent hospitalization.  She had presented to the ED with complaint of abdominal pain and gluteal pain.  Imaging concerning for necrotizing fasciitis.  Patient admitted to hospital and underwent debridement.  Fecal management system placed and patient started on IV antibiotics.  She was seen by infectious disease and transition to Augmentin which she will continue until 6/16/2023.    6/21/2023 Patient at [facility] nursing [facility] for therapy.  Patient has been actively participating in therapy and continues to work toward goals.  Patient requires minimal to moderate assist for sit to stand, supine to sit, and stand pivot transfers.  Patient has no new concerns today.  Feeling OK.  Denies n/v/f/c.  No new concerns per nursing staff.       6/22/2023 Patient presents for fu therapy and general medical care.  Patient has been working with therapy d/t generalized weakness.  Patient requires minimal to moderate assist for sit to supine, sit to stand, and stand pivot transfers.  She is able to ambulate short distances in room with front wheeled walker with moderate assist.  Tolerating therapy sessions.  Continues to work toward goals.  Patient has no new concerns today.  Feeling OK.  Denies n/v/f/c.       6/23/23   Patient continues to work in therapy.  Patient requires minimal to moderate assistance for transfers.    Patient is also ambulating short distances within her room with front wheeled walker and moderate assistance. No new issues or concerns.  No acute distress.     6/29/2023   Patient in therapy d/t generalized weakness.  Patient presents for f/u.  Continues to work toward goals in therapy.    Patient is stable without complaint.  No new concerns  reported by nursing.      Objective   Vital signs:   150/95, 98.0, 100, 18, 95%  Physical Exam  Constitutional:       General: She is not in acute distress.  Eyes:      Extraocular Movements: Extraocular movements intact.   Cardiovascular:      Rate and Rhythm: Normal rate and regular rhythm.      Arteriovenous access: Right arteriovenous access is present.     Comments: Right upper extremity AV fistula positive thrill positive bruit  Pulmonary:      Effort: Pulmonary effort is normal.      Breath sounds: Normal breath sounds.   Musculoskeletal:      Cervical back: Neck supple.      Comments: Generalized weakness   Skin:     Comments: Wound VAC to peritoneal area  Dialysis port to the right chest   Neurological:      Mental Status: She is alert.   Psychiatric:         Mood and Affect: Mood normal.         Behavior: Behavior is cooperative.         Assessment/Plan   Problem List Items Addressed This Visit       End stage renal disease (CMS/HCC)     HD per nephrology  Renal diet         Necrotizing fasciitis (CMS/[facility])     Antibiotics complete  Wound care per wound doctor         Secondary hypertension     Controlled  Continue antihypertensives  Monitor BP         Weakness - Primary     Continue therapy          Medications, treatments, and labs reviewed  Continue medications and treatments as listed in EMR    Scribe Attestation  IChristi Scribe   attest that this documentation has been prepared under the direction and in the presence of JOHN Garcia    Provider Attestation - Scribe documentation  All medical record entries made by the Scribe were at my direction and personally dictated by me. I have reviewed the chart and agree that the record accurately reflects my personal performance of the history, physical exam, discussion and plan.   JOHN Garcia

## 2023-07-02 NOTE — PROGRESS NOTES
PROGRESS NOTE    Subjective   Chief complaint: Afua Hilario is a 83 y.o. female who is an acute skilled patient being seen and evaluated for weakness    HPI:  6/19/2023 patient admitted to skilled nursing facility for therapy and medical management after recent hospitalization.  She had presented to the ED with complaint of abdominal pain and gluteal pain.  Imaging concerning for necrotizing fasciitis.  Patient admitted to hospital and underwent debridement.  Fecal management system placed and patient started on IV antibiotics.  She was seen by infectious disease and transition to Augmentin which she will continue until 6/16/2023.    6/21/2023 Patient at [facility] nursing [facility] for therapy.  Patient has been actively participating in therapy and continues to work toward goals.  Patient requires minimal to moderate assist for sit to stand, supine to sit, and stand pivot transfers.  Patient has no new concerns today.  Feeling OK.  Denies n/v/f/c.  No new concerns per nursing staff.       6/22/2023 Patient presents for fu therapy and general medical care.  Patient has been working with therapy d/t generalized weakness.  Patient requires minimal to moderate assist for sit to supine, sit to stand, and stand pivot transfers.  She is able to ambulate short distances in room with front wheeled walker with moderate assist.  Tolerating therapy sessions.  Continues to work toward goals.  Patient has no new concerns today.  Feeling OK.  Denies n/v/f/c.       6/23/23   Patient continues to work in therapy.  Patient requires minimal to moderate assistance for transfers.    Patient is also ambulating short distances within her room with front wheeled walker and moderate assistance. No new issues or concerns.  No acute distress.    6/26/2023 Patient progressing in therapy.  She is able to walk 10 feet with rollator walker with minimal assist.  Tolerating dialysis.  No new concerns at this time.  Denies  n/v/f/c.      Objective   Vital signs: 174/93    Physical Exam  Constitutional:       General: She is not in acute distress.  Eyes:      Extraocular Movements: Extraocular movements intact.   Cardiovascular:      Rate and Rhythm: Normal rate and regular rhythm.   Pulmonary:      Effort: Pulmonary effort is normal.      Breath sounds: Normal breath sounds.   Musculoskeletal:      Cervical back: Neck supple.      Comments: Generalized weakness   Skin:     Comments: Wound vac to peritoneal area   Neurological:      Mental Status: She is alert.   Psychiatric:         Behavior: Behavior is cooperative.         Assessment/Plan   Problem List Items Addressed This Visit       End stage renal disease (CMS/[facility])     HD per nephrology  Renal diet         Necrotizing fasciitis (CMS/[facility])     Antibiotics complete  Wound care per wound doctor         Secondary hypertension     BP usually in the 120s systolic  Continue antihypertensives  Monitor BP         Weakness - Primary     Continue with therapy          Medications, treatments, and labs reviewed  Continue medications and treatments as listed in EMR    BUBBA Garcia-CNP

## 2023-07-03 NOTE — LETTER
Patient: Afua Hilario  : 1940    Encounter Date: 2023    PROGRESS NOTE    Subjective  Chief complaint: Afua Hilario is a 83 y.o. female who is an acute skilled patient being seen and evaluated for weakness    HPI:  2023 patient admitted to [facility] nursing facility for therapy and medical management after recent hospitalization.  She had presented to the ED with complaint of abdominal pain and gluteal pain.  Imaging concerning for necrotizing fasciitis.  Patient admitted to hospital and underwent debridement.  Fecal management system placed and patient started on IV antibiotics.  She was seen by infectious disease and transition to Augmentin which she will continue until 2023.    2023 Patient at [facility] nursing [facility] for therapy.  Patient has been actively participating in therapy and continues to work toward goals.  Patient requires minimal to moderate assist for sit to stand, supine to sit, and stand pivot transfers.  Patient has no new concerns today.  Feeling OK.  Denies n/v/f/c.  No new concerns per nursing staff.       2023 Patient presents for fu therapy and general medical care.  Patient has been working with therapy d/t generalized weakness.  Patient requires minimal to moderate assist for sit to supine, sit to stand, and stand pivot transfers.  She is able to ambulate short distances in room with front wheeled walker with moderate assist.  Tolerating therapy sessions.  Continues to work toward goals.  Patient has no new concerns today.  Feeling OK.  Denies n/v/f/c.       23   Patient continues to work in therapy.  Patient requires minimal to moderate assistance for transfers.    Patient is also ambulating short distances within her room with front wheeled walker and moderate assistance. No new issues or concerns.  No acute distress.     2023   Patient in therapy d/t generalized weakness.  Patient presents for f/u.  Continues to work toward goals in  therapy.    Patient is stable without complaint.  No new concerns reported by nursing.    6/30/23 Patient working in therapy due to weakness and debility. She ambulates short distances with walker and moderate assistance. No acute distress. Denies chest pain or headache.     7/3/2023 Patient continues to work in therapy.   Patient is working on transfers.  She requires Edie lift.  She is unable to ambulate.  Speech therapy is working with the patient on swallowing and cognition.  Uneventful night.  No new concerns today.  Denies n/v/f/c.  No new concerns from nursing staff.          Objective  Vital signs: 139/72, 98.2, 87, 18, 94%    Physical Exam  Constitutional:       General: She is not in acute distress.  Eyes:      Extraocular Movements: Extraocular movements intact.   Cardiovascular:      Rate and Rhythm: Normal rate and regular rhythm.   Pulmonary:      Effort: Pulmonary effort is normal.      Breath sounds: Normal breath sounds.   Musculoskeletal:      Cervical back: Neck supple.      Comments: Generalized weakness   Skin:     Comments: Wound VAC to peritoneal area  Dialysis port to the right chest   Neurological:      Mental Status: She is alert.   Psychiatric:         Mood and Affect: Mood normal.         Behavior: Behavior is cooperative.         Assessment/Plan  Problem List Items Addressed This Visit       End stage renal disease (CMS/HCC)     HD per nephrology  Renal diet         Necrotizing fasciitis (CMS/HCC)     Antibiotics complete  Wound care per wound doctor         Secondary hypertension     BP usually in the 120s systolic  Continue antihypertensives  Monitor BP         Weakness - Primary     Continue working with therapy          Medications, treatments, and labs reviewed  Continue medications and treatments as listed in EMR    JOHN Garcia      Electronically Signed By: JOHN Garcia   7/3/23  6:20 PM

## 2023-07-03 NOTE — PROGRESS NOTES
PROGRESS NOTE    Subjective   Chief complaint: Afua Hilario is a 83 y.o. female who is an acute skilled patient being seen and evaluated for weakness    HPI:  6/19/2023 patient admitted to skilled nursing facility for therapy and medical management after recent hospitalization.  She had presented to the ED with complaint of abdominal pain and gluteal pain.  Imaging concerning for necrotizing fasciitis.  Patient admitted to hospital and underwent debridement.  Fecal management system placed and patient started on IV antibiotics.  She was seen by infectious disease and transition to Augmentin which she will continue until 6/16/2023.    6/21/2023 Patient at [facility] nursing [facility] for therapy.  Patient has been actively participating in therapy and continues to work toward goals.  Patient requires minimal to moderate assist for sit to stand, supine to sit, and stand pivot transfers.  Patient has no new concerns today.  Feeling OK.  Denies n/v/f/c.  No new concerns per nursing staff.       6/22/2023 Patient presents for fu therapy and general medical care.  Patient has been working with therapy d/t generalized weakness.  Patient requires minimal to moderate assist for sit to supine, sit to stand, and stand pivot transfers.  She is able to ambulate short distances in room with front wheeled walker with moderate assist.  Tolerating therapy sessions.  Continues to work toward goals.  Patient has no new concerns today.  Feeling OK.  Denies n/v/f/c.       6/23/23   Patient continues to work in therapy.  Patient requires minimal to moderate assistance for transfers.    Patient is also ambulating short distances within her room with front wheeled walker and moderate assistance. No new issues or concerns.  No acute distress.     6/29/2023   Patient in therapy d/t generalized weakness.  Patient presents for f/u.  Continues to work toward goals in therapy.    Patient is stable without complaint.  No new concerns reported  by nursing.    6/30/23 Patient working in therapy due to weakness and debility. She ambulates short distances with walker and moderate assistance. No acute distress. Denies chest pain or headache.     7/3/2023 Patient continues to work in therapy.   Patient is working on transfers.  She requires Edie lift.  She is unable to ambulate.  Speech therapy is working with the patient on swallowing and cognition.  Uneventful night.  No new concerns today.  Denies n/v/f/c.  No new concerns from nursing staff.          Objective   Vital signs: 139/72, 98.2, 87, 18, 94%    Physical Exam  Constitutional:       General: She is not in acute distress.  Eyes:      Extraocular Movements: Extraocular movements intact.   Cardiovascular:      Rate and Rhythm: Normal rate and regular rhythm.   Pulmonary:      Effort: Pulmonary effort is normal.      Breath sounds: Normal breath sounds.   Musculoskeletal:      Cervical back: Neck supple.      Comments: Generalized weakness   Skin:     Comments: Wound VAC to peritoneal area  Dialysis port to the right chest   Neurological:      Mental Status: She is alert.   Psychiatric:         Mood and Affect: Mood normal.         Behavior: Behavior is cooperative.         Assessment/Plan   Problem List Items Addressed This Visit       End stage renal disease (CMS/HCC)     HD per nephrology  Renal diet         Necrotizing fasciitis (CMS/HCC)     Antibiotics complete  Wound care per wound doctor         Secondary hypertension     BP usually in the 120s systolic  Continue antihypertensives  Monitor BP         Weakness - Primary     Continue working with therapy          Medications, treatments, and labs reviewed  Continue medications and treatments as listed in EMR    BUBBA Garcia-CNP

## 2023-07-04 NOTE — LETTER
Patient: Afua Hilario  : 1940    Encounter Date: 2023    PROGRESS NOTE    Subjective  Chief complaint: Afua Hilario is a 83 y.o. female who is an acute skilled patient being seen and evaluated for weakness    HPI:  2023 patient admitted to [facility] nursing facility for therapy and medical management after recent hospitalization.  She had presented to the ED with complaint of abdominal pain and gluteal pain.  Imaging concerning for necrotizing fasciitis.  Patient admitted to hospital and underwent debridement.  Fecal management system placed and patient started on IV antibiotics.  She was seen by infectious disease and transition to Augmentin which she will continue until 2023.    2023 Patient at [facility] nursing [facility] for therapy.  Patient has been actively participating in therapy and continues to work toward goals.  Patient requires minimal to moderate assist for sit to stand, supine to sit, and stand pivot transfers.  Patient has no new concerns today.  Feeling OK.  Denies n/v/f/c.  No new concerns per nursing staff.       2023 Patient presents for fu therapy and general medical care.  Patient has been working with therapy d/t generalized weakness.  Patient requires minimal to moderate assist for sit to supine, sit to stand, and stand pivot transfers.  She is able to ambulate short distances in room with front wheeled walker with moderate assist.  Tolerating therapy sessions.  Continues to work toward goals.  Patient has no new concerns today.  Feeling OK.  Denies n/v/f/c.       23   Patient continues to work in therapy.  Patient requires minimal to moderate assistance for transfers.    Patient is also ambulating short distances within her room with front wheeled walker and moderate assistance. No new issues or concerns.  No acute distress.     2023   Patient in therapy d/t generalized weakness.  Patient presents for f/u.  Continues to work toward goals in  therapy.    Patient is stable without complaint.  No new concerns reported by nursing.    6/30/23 Patient working in therapy due to weakness and debility. She ambulates short distances with walker and moderate assistance. No acute distress. Denies chest pain or headache.     7/3/2023 Patient continues to work in therapy.   Patient is working on transfers.  She requires Edie lift.  She is unable to ambulate.  Speech therapy is working with the patient on swallowing and cognition.  Uneventful night.  No new concerns today.  Denies n/v/f/c.  No new concerns from nursing staff.      7/4/23 Patient working in therapy due to weakness. Patient does not walk at baseline and requires assistance for transfers, ADL's and mobility. No acute distress.       Objective  Vital signs: 136/74, 94%    Physical Exam  Constitutional:       General: She is not in acute distress.  Eyes:      Extraocular Movements: Extraocular movements intact.   Cardiovascular:      Rate and Rhythm: Normal rate and regular rhythm.   Pulmonary:      Effort: Pulmonary effort is normal.      Breath sounds: Normal breath sounds.   Musculoskeletal:      Cervical back: Neck supple.      Comments: Generalized weakness   Skin:     Comments: Wound VAC to peritoneal area  Dialysis port to the right chest   Neurological:      Mental Status: She is alert.   Psychiatric:         Mood and Affect: Mood normal.         Behavior: Behavior is cooperative.         Assessment/Plan  Problem List Items Addressed This Visit          Cardiac and Vasculature    Secondary hypertension     BP usually in the 120s systolic  Continue antihypertensives  Monitor BP            Symptoms and Signs    Weakness     Continue working with therapy        Medications, treatments, and labs reviewed  Continue medications and treatments as listed in EMR    Sridevi Mitchell MD  1. Secondary hypertension        2. Weakness          Scribe Attestation  IKimberlee Scribe   attest that this  documentation has been prepared under the direction and in the presence of Sridevi Mitchell MD    Provider Attestation - Scribe documentation  All medical record entries made by the Scribe were at my direction and personally dictated by me. I have reviewed the chart and agree that the record accurately reflects my personal performance of the history, physical exam, discussion and plan.   Sridevi Mitchell MD      Electronically Signed By: Sridevi Mitchell MD   7/5/23  1:19 PM

## 2023-07-04 NOTE — PROGRESS NOTES
PROGRESS NOTE    Subjective   Chief complaint: Afua Hilario is a 83 y.o. female who is an acute skilled patient being seen and evaluated for weakness    HPI:  6/19/2023 patient admitted to skilled nursing facility for therapy and medical management after recent hospitalization.  She had presented to the ED with complaint of abdominal pain and gluteal pain.  Imaging concerning for necrotizing fasciitis.  Patient admitted to hospital and underwent debridement.  Fecal management system placed and patient started on IV antibiotics.  She was seen by infectious disease and transition to Augmentin which she will continue until 6/16/2023.    6/21/2023 Patient at [facility] nursing [facility] for therapy.  Patient has been actively participating in therapy and continues to work toward goals.  Patient requires minimal to moderate assist for sit to stand, supine to sit, and stand pivot transfers.  Patient has no new concerns today.  Feeling OK.  Denies n/v/f/c.  No new concerns per nursing staff.       6/22/2023 Patient presents for fu therapy and general medical care.  Patient has been working with therapy d/t generalized weakness.  Patient requires minimal to moderate assist for sit to supine, sit to stand, and stand pivot transfers.  She is able to ambulate short distances in room with front wheeled walker with moderate assist.  Tolerating therapy sessions.  Continues to work toward goals.  Patient has no new concerns today.  Feeling OK.  Denies n/v/f/c.       6/23/23   Patient continues to work in therapy.  Patient requires minimal to moderate assistance for transfers.    Patient is also ambulating short distances within her room with front wheeled walker and moderate assistance. No new issues or concerns.  No acute distress.     6/29/2023   Patient in therapy d/t generalized weakness.  Patient presents for f/u.  Continues to work toward goals in therapy.    Patient is stable without complaint.  No new concerns reported  by nursing.    6/30/23 Patient working in therapy due to weakness and debility. She ambulates short distances with walker and moderate assistance. No acute distress. Denies chest pain or headache.     7/3/2023 Patient continues to work in therapy.   Patient is working on transfers.  She requires Edie lift.  She is unable to ambulate.  Speech therapy is working with the patient on swallowing and cognition.  Uneventful night.  No new concerns today.  Denies n/v/f/c.  No new concerns from nursing staff.      7/4/23 Patient working in therapy due to weakness. Patient does not walk at baseline and requires assistance for transfers, ADL's and mobility. No acute distress.       Objective   Vital signs: 136/74, 94%    Physical Exam  Constitutional:       General: She is not in acute distress.  Eyes:      Extraocular Movements: Extraocular movements intact.   Cardiovascular:      Rate and Rhythm: Normal rate and regular rhythm.   Pulmonary:      Effort: Pulmonary effort is normal.      Breath sounds: Normal breath sounds.   Musculoskeletal:      Cervical back: Neck supple.      Comments: Generalized weakness   Skin:     Comments: Wound VAC to peritoneal area  Dialysis port to the right chest   Neurological:      Mental Status: She is alert.   Psychiatric:         Mood and Affect: Mood normal.         Behavior: Behavior is cooperative.         Assessment/Plan   Problem List Items Addressed This Visit          Cardiac and Vasculature    Secondary hypertension     BP usually in the 120s systolic  Continue antihypertensives  Monitor BP            Symptoms and Signs    Weakness     Continue working with therapy        Medications, treatments, and labs reviewed  Continue medications and treatments as listed in EMR    Sridevi Mitchell MD  1. Secondary hypertension        2. Weakness          Scribe Attestation  I, Melissa Booth   attest that this documentation has been prepared under the direction and in the presence of  Sridevi Mitchell MD    Provider Attestation - Scribe documentation  All medical record entries made by the Scribe were at my direction and personally dictated by me. I have reviewed the chart and agree that the record accurately reflects my personal performance of the history, physical exam, discussion and plan.   Sridevi Mitchell MD

## 2023-07-05 NOTE — PROGRESS NOTES
PROGRESS NOTE    Subjective   Chief complaint: Afua Hilario is a 83 y.o. female who is an acute skilled patient being seen and evaluated for weakness    HPI:  6/19/2023 patient admitted to skilled nursing facility for therapy and medical management after recent hospitalization.  She had presented to the ED with complaint of abdominal pain and gluteal pain.  Imaging concerning for necrotizing fasciitis.  Patient admitted to hospital and underwent debridement.  Fecal management system placed and patient started on IV antibiotics.  She was seen by infectious disease and transition to Augmentin which she will continue until 6/16/2023.    6/21/2023 Patient at [facility] nursing [facility] for therapy.  Patient has been actively participating in therapy and continues to work toward goals.  Patient requires minimal to moderate assist for sit to stand, supine to sit, and stand pivot transfers.  Patient has no new concerns today.  Feeling OK.  Denies n/v/f/c.  No new concerns per nursing staff.       6/22/2023 Patient presents for fu therapy and general medical care.  Patient has been working with therapy d/t generalized weakness.  Patient requires minimal to moderate assist for sit to supine, sit to stand, and stand pivot transfers.  She is able to ambulate short distances in room with front wheeled walker with moderate assist.  Tolerating therapy sessions.  Continues to work toward goals.  Patient has no new concerns today.  Feeling OK.  Denies n/v/f/c.       6/23/23   Patient continues to work in therapy.  Patient requires minimal to moderate assistance for transfers.    Patient is also ambulating short distances within her room with front wheeled walker and moderate assistance. No new issues or concerns.  No acute distress.     6/29/2023   Patient in therapy d/t generalized weakness.  Patient presents for f/u.  Continues to work toward goals in therapy.    Patient is stable without complaint.  No new concerns reported  by nursing.    6/30/23 Patient working in therapy due to weakness and debility. She ambulates short distances with walker and moderate assistance. No acute distress. Denies chest pain or headache.     7/3/2023 Patient continues to work in therapy.   Patient is working on transfers.  She requires Edie lift.  She is unable to ambulate.  Speech therapy is working with the patient on swallowing and cognition.  Uneventful night.  No new concerns today.  Denies n/v/f/c.  No new concerns from nursing staff.      7/4/23 Patient working in therapy due to weakness. Patient does not walk at baseline and requires assistance for transfers, ADL's and mobility. No acute distress.     7/5/2023 Patient is at SNF and working in therapy.   Feels therapy is going ok, no obstacles/barriers.  Patient is able to perform supine to sit with moderate to maximum assist, sit to stand with moderate assist, pivot transfer with moderate to maximum assist.  She requires wheelchair for mobility.  She demonstrates poor balance according to the therapist.  Patient is feeling ok and has no new concerns today.  Denies constitutional symptoms.  No new concerns from nursing staff.          Objective   Vital signs: 117/86, 98.0, 89, 18, 95%    Physical Exam  Constitutional:       General: She is not in acute distress.  Eyes:      Extraocular Movements: Extraocular movements intact.   Cardiovascular:      Rate and Rhythm: Normal rate and regular rhythm.   Pulmonary:      Effort: Pulmonary effort is normal.      Breath sounds: Normal breath sounds.   Abdominal:      Comments: Fecal management system   Musculoskeletal:      Cervical back: Neck supple.      Comments: Generalized weakness   Skin:     Comments: Wound VAC to peritoneal area  Dialysis port to the right chest   Neurological:      Mental Status: She is alert.   Psychiatric:         Mood and Affect: Mood normal.         Behavior: Behavior is cooperative.         Assessment/Plan   Problem List Items  Addressed This Visit       End stage renal disease (CMS/[facility])     HD per nephrology, tolerating treatments  Renal diet         Necrotizing fasciitis (CMS/[facility])     Antibiotics complete  Wound care per wound doctor  Wound VAC intact         Secondary hypertension     BP at goal  Continue antihypertensives  Monitor BP         Weakness - Primary     Continue working with therapy          Medications, treatments, and labs reviewed  Continue medications and treatments as listed in EMR    Heather Sierra, APRN-CNP

## 2023-07-05 NOTE — LETTER
Patient: Afua Hilario  : 1940    Encounter Date: 2023    PROGRESS NOTE    Subjective  Chief complaint: Afua Hilario is a 83 y.o. female who is an acute skilled patient being seen and evaluated for weakness    HPI:  2023 patient admitted to [facility] nursing facility for therapy and medical management after recent hospitalization.  She had presented to the ED with complaint of abdominal pain and gluteal pain.  Imaging concerning for necrotizing fasciitis.  Patient admitted to hospital and underwent debridement.  Fecal management system placed and patient started on IV antibiotics.  She was seen by infectious disease and transition to Augmentin which she will continue until 2023.    2023 Patient at [facility] nursing [facility] for therapy.  Patient has been actively participating in therapy and continues to work toward goals.  Patient requires minimal to moderate assist for sit to stand, supine to sit, and stand pivot transfers.  Patient has no new concerns today.  Feeling OK.  Denies n/v/f/c.  No new concerns per nursing staff.       2023 Patient presents for fu therapy and general medical care.  Patient has been working with therapy d/t generalized weakness.  Patient requires minimal to moderate assist for sit to supine, sit to stand, and stand pivot transfers.  She is able to ambulate short distances in room with front wheeled walker with moderate assist.  Tolerating therapy sessions.  Continues to work toward goals.  Patient has no new concerns today.  Feeling OK.  Denies n/v/f/c.       23   Patient continues to work in therapy.  Patient requires minimal to moderate assistance for transfers.    Patient is also ambulating short distances within her room with front wheeled walker and moderate assistance. No new issues or concerns.  No acute distress.     2023   Patient in therapy d/t generalized weakness.  Patient presents for f/u.  Continues to work toward goals in  therapy.    Patient is stable without complaint.  No new concerns reported by nursing.    6/30/23 Patient working in therapy due to weakness and debility. She ambulates short distances with walker and moderate assistance. No acute distress. Denies chest pain or headache.     7/3/2023 Patient continues to work in therapy.   Patient is working on transfers.  She requires Edie lift.  She is unable to ambulate.  Speech therapy is working with the patient on swallowing and cognition.  Uneventful night.  No new concerns today.  Denies n/v/f/c.  No new concerns from nursing staff.      7/4/23 Patient working in therapy due to weakness. Patient does not walk at baseline and requires assistance for transfers, ADL's and mobility. No acute distress.     7/5/2023 Patient is at SNF and working in therapy.   Feels therapy is going ok, no obstacles/barriers.  Patient is able to perform supine to sit with moderate to maximum assist, sit to stand with moderate assist, pivot transfer with moderate to maximum assist.  She requires wheelchair for mobility.  She demonstrates poor balance according to the therapist.  Patient is feeling ok and has no new concerns today.  Denies constitutional symptoms.  No new concerns from nursing staff.          Objective  Vital signs: 117/86, 98.0, 89, 18, 95%    Physical Exam  Constitutional:       General: She is not in acute distress.  Eyes:      Extraocular Movements: Extraocular movements intact.   Cardiovascular:      Rate and Rhythm: Normal rate and regular rhythm.   Pulmonary:      Effort: Pulmonary effort is normal.      Breath sounds: Normal breath sounds.   Abdominal:      Comments: Fecal management system   Musculoskeletal:      Cervical back: Neck supple.      Comments: Generalized weakness   Skin:     Comments: Wound VAC to peritoneal area  Dialysis port to the right chest   Neurological:      Mental Status: She is alert.   Psychiatric:         Mood and Affect: Mood normal.          Behavior: Behavior is cooperative.         Assessment/Plan  Problem List Items Addressed This Visit       End stage renal disease (CMS/[facility])     HD per nephrology, tolerating treatments  Renal diet         Necrotizing fasciitis (CMS/[facility])     Antibiotics complete  Wound care per wound doctor  Wound VAC intact         Secondary hypertension     BP at goal  Continue antihypertensives  Monitor BP         Weakness - Primary     Continue working with therapy          Medications, treatments, and labs reviewed  Continue medications and treatments as listed in EMR    JOHN Garcia      Electronically Signed By: JOHN Garcia   7/5/23 12:18 PM

## 2023-07-06 NOTE — LETTER
Patient: Afua Hilario  : 1940    Encounter Date: 2023    PROGRESS NOTE    Subjective  Chief complaint: Afua Hilario is a 83 y.o. female who is a acute skilled care patient being seen and evaluated for weakness.    HPI:  2023 patient admitted to skilled nursing facility for therapy and medical management after recent hospitalization.  She had presented to the ED with complaint of abdominal pain and gluteal pain.  Imaging concerning for necrotizing fasciitis.  Patient admitted to hospital and underwent debridement.  Fecal management system placed and patient started on IV antibiotics.  She was seen by infectious disease and transition to Augmentin which she will continue until 2023.    2023 Patient at skilled nursing [facility] for therapy.  Patient has been actively participating in therapy and continues to work toward goals.  Patient requires minimal to moderate assist for sit to stand, supine to sit, and stand pivot transfers.  Patient has no new concerns today.  Feeling OK.  Denies n/v/f/c.  No new concerns per nursing staff.       2023 Patient presents for fu therapy and general medical care.  Patient has been working with therapy d/t generalized weakness.  Patient requires minimal to moderate assist for sit to supine, sit to stand, and stand pivot transfers.  She is able to ambulate short distances in room with front wheeled walker with moderate assist.  Tolerating therapy sessions.  Continues to work toward goals.  Patient has no new concerns today.  Feeling OK.  Denies n/v/f/c.       23   Patient continues to work in therapy.  Patient requires minimal to moderate assistance for transfers.    Patient is also ambulating short distances within her room with front wheeled walker and moderate assistance. No new issues or concerns.  No acute distress.     2023   Patient in therapy d/t generalized weakness.  Patient presents for f/u.  Continues to work toward goals  in therapy.    Patient is stable without complaint.  No new concerns reported by nursing.    6/30/23 Patient working in therapy due to weakness and debility. She ambulates short distances with walker and moderate assistance. No acute distress. Denies chest pain or headache.     7/3/2023 Patient continues to work in therapy.   Patient is working on transfers.  She requires Edie lift.  She is unable to ambulate.  Speech therapy is working with the patient on swallowing and cognition.  Uneventful night.  No new concerns today.  Denies n/v/f/c.  No new concerns from nursing staff.      7/4/23 Patient working in therapy due to weakness. Patient does not walk at baseline and requires assistance for transfers, ADL's and mobility. No acute distress.     7/5/2023 Patient is at SNF and working in therapy.   Feels therapy is going ok, no obstacles/barriers.  Patient is able to perform supine to sit with moderate to maximum assist, sit to stand with moderate assist, pivot transfer with moderate to maximum assist.  She requires wheelchair for mobility.  She demonstrates poor balance according to the therapist.  Patient is feeling ok and has no new concerns today.  Denies constitutional symptoms.  No new concerns from nursing staff.     7/6/23   Patient continues working in PT/OT/ST.  Currently working on therapy activities including gross motor coordination, bed mobility, postural control and ROM techniques.  Patient TD+ for rolling R and L.  Limited activity tolerance today per therapist.  Working with ST on swallow treatment.           Objective  Vital signs: 117/86, 98.0, 89, 18, 95%  Physical Exam  Constitutional:       General: She is not in acute distress.  Eyes:      Extraocular Movements: Extraocular movements intact.   Cardiovascular:      Rate and Rhythm: Normal rate and regular rhythm.   Pulmonary:      Effort: Pulmonary effort is normal.      Breath sounds: Normal breath sounds.   Abdominal:      Comments: Fecal  management system   Musculoskeletal:      Cervical back: Neck supple.      Comments: Generalized weakness   Skin:     Comments: Wound VAC to peritoneal area   Neurological:      Mental Status: She is alert.   Psychiatric:         Mood and Affect: Mood normal.         Behavior: Behavior is cooperative.         Assessment/Plan  Problem List Items Addressed This Visit       End stage renal disease (CMS/[facility])     HD per nephrology  Renal diet         Necrotizing fasciitis (CMS/[facility])     Antibiotics complete  Wound care per wound doctor  Wound VAC intact         Secondary hypertension     BP at goal  Continue antihypertensives  Monitor BP         Weakness - Primary     Continue working with therapy          Medications, treatments, and labs reviewed  Continue medications and treatments as listed in EMR    Scribe Attestation  I, Melissa Storey   attest that this documentation has been prepared under the direction and in the presence of JOHN Garcia    Provider Attestation - Scribe documentation  All medical record entries made by the Scribe were at my direction and personally dictated by me. I have reviewed the chart and agree that the record accurately reflects my personal performance of the history, physical exam, discussion and plan.   JOHN Garcia        Electronically Signed By: JOHN Garcia   7/8/23 10:54 AM

## 2023-07-06 NOTE — PROGRESS NOTES
PROGRESS NOTE    Subjective   Chief complaint: Afua Hilario is a 83 y.o. female who is a acute skilled care patient being seen and evaluated for weakness.    HPI:  6/19/2023 patient admitted to skilled nursing facility for therapy and medical management after recent hospitalization.  She had presented to the ED with complaint of abdominal pain and gluteal pain.  Imaging concerning for necrotizing fasciitis.  Patient admitted to hospital and underwent debridement.  Fecal management system placed and patient started on IV antibiotics.  She was seen by infectious disease and transition to Augmentin which she will continue until 6/16/2023.    6/21/2023 Patient at [facility] nursing [facility] for therapy.  Patient has been actively participating in therapy and continues to work toward goals.  Patient requires minimal to moderate assist for sit to stand, supine to sit, and stand pivot transfers.  Patient has no new concerns today.  Feeling OK.  Denies n/v/f/c.  No new concerns per nursing staff.       6/22/2023 Patient presents for fu therapy and general medical care.  Patient has been working with therapy d/t generalized weakness.  Patient requires minimal to moderate assist for sit to supine, sit to stand, and stand pivot transfers.  She is able to ambulate short distances in room with front wheeled walker with moderate assist.  Tolerating therapy sessions.  Continues to work toward goals.  Patient has no new concerns today.  Feeling OK.  Denies n/v/f/c.       6/23/23   Patient continues to work in therapy.  Patient requires minimal to moderate assistance for transfers.    Patient is also ambulating short distances within her room with front wheeled walker and moderate assistance. No new issues or concerns.  No acute distress.     6/29/2023   Patient in therapy d/t generalized weakness.  Patient presents for f/u.  Continues to work toward goals in therapy.    Patient is stable without complaint.  No new concerns  reported by nursing.    6/30/23 Patient working in therapy due to weakness and debility. She ambulates short distances with walker and moderate assistance. No acute distress. Denies chest pain or headache.     7/3/2023 Patient continues to work in therapy.   Patient is working on transfers.  She requires Edie lift.  She is unable to ambulate.  Speech therapy is working with the patient on swallowing and cognition.  Uneventful night.  No new concerns today.  Denies n/v/f/c.  No new concerns from nursing staff.      7/4/23 Patient working in therapy due to weakness. Patient does not walk at baseline and requires assistance for transfers, ADL's and mobility. No acute distress.     7/5/2023 Patient is at SNF and working in therapy.   Feels therapy is going ok, no obstacles/barriers.  Patient is able to perform supine to sit with moderate to maximum assist, sit to stand with moderate assist, pivot transfer with moderate to maximum assist.  She requires wheelchair for mobility.  She demonstrates poor balance according to the therapist.  Patient is feeling ok and has no new concerns today.  Denies constitutional symptoms.  No new concerns from nursing staff.     7/6/23   Patient continues working in PT/OT/ST.  Currently working on therapy activities including gross motor coordination, bed mobility, postural control and ROM techniques.  Patient TD+ for rolling R and L.  Limited activity tolerance today per therapist.  Working with ST on swallow treatment.           Objective   Vital signs: 117/86, 98.0, 89, 18, 95%  Physical Exam  Constitutional:       General: She is not in acute distress.  Eyes:      Extraocular Movements: Extraocular movements intact.   Cardiovascular:      Rate and Rhythm: Normal rate and regular rhythm.   Pulmonary:      Effort: Pulmonary effort is normal.      Breath sounds: Normal breath sounds.   Abdominal:      Comments: Fecal management system   Musculoskeletal:      Cervical back: Neck supple.       Comments: Generalized weakness   Skin:     Comments: Wound VAC to peritoneal area   Neurological:      Mental Status: She is alert.   Psychiatric:         Mood and Affect: Mood normal.         Behavior: Behavior is cooperative.         Assessment/Plan   Problem List Items Addressed This Visit       End stage renal disease (CMS/[facility])     HD per nephrology  Renal diet         Necrotizing fasciitis (CMS/[facility])     Antibiotics complete  Wound care per wound doctor  Wound VAC intact         Secondary hypertension     BP at goal  Continue antihypertensives  Monitor BP         Weakness - Primary     Continue working with therapy          Medications, treatments, and labs reviewed  Continue medications and treatments as listed in EMR    Scribe Attestation  IChristi Scribe   attest that this documentation has been prepared under the direction and in the presence of JOHN Garcia    Provider Attestation - Scribe documentation  All medical record entries made by the Scribe were at my direction and personally dictated by me. I have reviewed the chart and agree that the record accurately reflects my personal performance of the history, physical exam, discussion and plan.   JOHN Garcia

## 2023-07-07 NOTE — LETTER
Patient: Afua Hilario  : 1940    Encounter Date: 2023    PROGRESS NOTE    Subjective  Chief complaint: Afua Hilario is a 83 y.o. female who is a acute skilled care patient being seen and evaluated for weakness.    HPI:  2023 patient admitted to skilled nursing facility for therapy and medical management after recent hospitalization.  She had presented to the ED with complaint of abdominal pain and gluteal pain.  Imaging concerning for necrotizing fasciitis.  Patient admitted to hospital and underwent debridement.  Fecal management system placed and patient started on IV antibiotics.  She was seen by infectious disease and transition to Augmentin which she will continue until 2023.    2023 Patient at skilled nursing [facility] for therapy.  Patient has been actively participating in therapy and continues to work toward goals.  Patient requires minimal to moderate assist for sit to stand, supine to sit, and stand pivot transfers.  Patient has no new concerns today.  Feeling OK.  Denies n/v/f/c.  No new concerns per nursing staff.       2023 Patient presents for fu therapy and general medical care.  Patient has been working with therapy d/t generalized weakness.  Patient requires minimal to moderate assist for sit to supine, sit to stand, and stand pivot transfers.  She is able to ambulate short distances in room with front wheeled walker with moderate assist.  Tolerating therapy sessions.  Continues to work toward goals.  Patient has no new concerns today.  Feeling OK.  Denies n/v/f/c.       23   Patient continues to work in therapy.  Patient requires minimal to moderate assistance for transfers.    Patient is also ambulating short distances within her room with front wheeled walker and moderate assistance. No new issues or concerns.  No acute distress.     2023   Patient in therapy d/t generalized weakness.  Patient presents for f/u.  Continues to work toward goals  in therapy.    Patient is stable without complaint.  No new concerns reported by nursing.    6/30/23 Patient working in therapy due to weakness and debility. She ambulates short distances with walker and moderate assistance. No acute distress. Denies chest pain or headache.     7/3/2023 Patient continues to work in therapy.   Patient is working on transfers.  She requires Edie lift.  She is unable to ambulate.  Speech therapy is working with the patient on swallowing and cognition.  Uneventful night.  No new concerns today.  Denies n/v/f/c.  No new concerns from nursing staff.      7/4/23 Patient working in therapy due to weakness. Patient does not walk at baseline and requires assistance for transfers, ADL's and mobility. No acute distress.     7/5/2023 Patient is at SNF and working in therapy.   Feels therapy is going ok, no obstacles/barriers.  Patient is able to perform supine to sit with moderate to maximum assist, sit to stand with moderate assist, pivot transfer with moderate to maximum assist.  She requires wheelchair for mobility.  She demonstrates poor balance according to the therapist.  Patient is feeling ok and has no new concerns today.  Denies constitutional symptoms.  No new concerns from nursing staff.     7/6/23   Patient continues working in PT/OT/ST.  Currently working on therapy activities including gross motor coordination, bed mobility, postural control and ROM techniques.  Patient TD+ for rolling R and L.  Limited activity tolerance today per therapist.  Working with ST on swallow treatment.      7/7/23   Patient continues to work in therapy.  Patient requires moderate assistance for transfers and ADLs.  Denies chest pain or headache.  No acute distress.         Objective  Vital signs: 121/84, 95%  Physical Exam  Constitutional:       General: She is not in acute distress.  Eyes:      Extraocular Movements: Extraocular movements intact.   Cardiovascular:      Rate and Rhythm: Normal rate and  regular rhythm.   Pulmonary:      Effort: Pulmonary effort is normal.      Breath sounds: Normal breath sounds.   Abdominal:      Comments: Fecal management system   Musculoskeletal:      Cervical back: Neck supple.      Comments: Generalized weakness   Skin:     Comments: Wound VAC to peritoneal area   Neurological:      Mental Status: She is alert.   Psychiatric:         Mood and Affect: Mood normal.         Behavior: Behavior is cooperative.         Assessment/Plan  Problem List Items Addressed This Visit          Cardiac and Vasculature    Secondary hypertension     BP at goal  Continue antihypertensives  Monitor BP            Symptoms and Signs    Weakness     Continue working with therapy        Medications, treatments, and labs reviewed  Continue medications and treatments as listed in EMR    Scribe Attestation  I, Kimberlee Sun, Scribe   attest that this documentation has been prepared under the direction and in the presence of Sridevi Mitchell MD    Provider Attestation - Scribe documentation  All medical record entries made by the Scribe were at my direction and personally dictated by me. I have reviewed the chart and agree that the record accurately reflects my personal performance of the history, physical exam, discussion and plan.   Sridevi Mitchell MD    1. Secondary hypertension        2. Weakness                  Electronically Signed By: Sridevi Mitchell MD   7/8/23  1:55 PM

## 2023-07-08 NOTE — PROGRESS NOTES
PROGRESS NOTE    Subjective   Chief complaint: Afua Hilario is a 83 y.o. female who is a acute skilled care patient being seen and evaluated for weakness.    HPI:  6/19/2023 patient admitted to skilled nursing facility for therapy and medical management after recent hospitalization.  She had presented to the ED with complaint of abdominal pain and gluteal pain.  Imaging concerning for necrotizing fasciitis.  Patient admitted to hospital and underwent debridement.  Fecal management system placed and patient started on IV antibiotics.  She was seen by infectious disease and transition to Augmentin which she will continue until 6/16/2023.    6/21/2023 Patient at [facility] nursing [facility] for therapy.  Patient has been actively participating in therapy and continues to work toward goals.  Patient requires minimal to moderate assist for sit to stand, supine to sit, and stand pivot transfers.  Patient has no new concerns today.  Feeling OK.  Denies n/v/f/c.  No new concerns per nursing staff.       6/22/2023 Patient presents for fu therapy and general medical care.  Patient has been working with therapy d/t generalized weakness.  Patient requires minimal to moderate assist for sit to supine, sit to stand, and stand pivot transfers.  She is able to ambulate short distances in room with front wheeled walker with moderate assist.  Tolerating therapy sessions.  Continues to work toward goals.  Patient has no new concerns today.  Feeling OK.  Denies n/v/f/c.       6/23/23   Patient continues to work in therapy.  Patient requires minimal to moderate assistance for transfers.    Patient is also ambulating short distances within her room with front wheeled walker and moderate assistance. No new issues or concerns.  No acute distress.     6/29/2023   Patient in therapy d/t generalized weakness.  Patient presents for f/u.  Continues to work toward goals in therapy.    Patient is stable without complaint.  No new concerns  reported by nursing.    6/30/23 Patient working in therapy due to weakness and debility. She ambulates short distances with walker and moderate assistance. No acute distress. Denies chest pain or headache.     7/3/2023 Patient continues to work in therapy.   Patient is working on transfers.  She requires Edie lift.  She is unable to ambulate.  Speech therapy is working with the patient on swallowing and cognition.  Uneventful night.  No new concerns today.  Denies n/v/f/c.  No new concerns from nursing staff.      7/4/23 Patient working in therapy due to weakness. Patient does not walk at baseline and requires assistance for transfers, ADL's and mobility. No acute distress.     7/5/2023 Patient is at SNF and working in therapy.   Feels therapy is going ok, no obstacles/barriers.  Patient is able to perform supine to sit with moderate to maximum assist, sit to stand with moderate assist, pivot transfer with moderate to maximum assist.  She requires wheelchair for mobility.  She demonstrates poor balance according to the therapist.  Patient is feeling ok and has no new concerns today.  Denies constitutional symptoms.  No new concerns from nursing staff.     7/6/23   Patient continues working in PT/OT/ST.  Currently working on therapy activities including gross motor coordination, bed mobility, postural control and ROM techniques.  Patient TD+ for rolling R and L.  Limited activity tolerance today per therapist.  Working with ST on swallow treatment.      7/7/23   Patient continues to work in therapy.  Patient requires moderate assistance for transfers and ADLs.  Denies chest pain or headache.  No acute distress.         Objective   Vital signs: 121/84, 95%  Physical Exam  Constitutional:       General: She is not in acute distress.  Eyes:      Extraocular Movements: Extraocular movements intact.   Cardiovascular:      Rate and Rhythm: Normal rate and regular rhythm.   Pulmonary:      Effort: Pulmonary effort is  normal.      Breath sounds: Normal breath sounds.   Abdominal:      Comments: Fecal management system   Musculoskeletal:      Cervical back: Neck supple.      Comments: Generalized weakness   Skin:     Comments: Wound VAC to peritoneal area   Neurological:      Mental Status: She is alert.   Psychiatric:         Mood and Affect: Mood normal.         Behavior: Behavior is cooperative.         Assessment/Plan   Problem List Items Addressed This Visit          Cardiac and Vasculature    Secondary hypertension     BP at goal  Continue antihypertensives  Monitor BP            Symptoms and Signs    Weakness     Continue working with therapy        Medications, treatments, and labs reviewed  Continue medications and treatments as listed in EMR    Scribe Attestation  I, Kimberlee Sun Scribe   attest that this documentation has been prepared under the direction and in the presence of Sridevi Mitchell MD    Provider Attestation - Scribe documentation  All medical record entries made by the Scribe were at my direction and personally dictated by me. I have reviewed the chart and agree that the record accurately reflects my personal performance of the history, physical exam, discussion and plan.   Sridevi Mitchell MD    1. Secondary hypertension        2. Weakness

## 2023-08-04 NOTE — LETTER
Patient: Afua Hilario  : 1940    Encounter Date: 2023    HISTORY & PHYSICAL    Subjective  Chief complaint: Afua Hilario is a 83 y.o. female who is a acute skilled care patient being seen and evaluated for multiple medical problems.  Patient presents for weakness.    HPI:  Patient was presented to the ED with abdominal pain. Patient was given IV fluids and started on broad spectrum antibiotics. CT abdomen pelvis showed possible changes of pancreatitis. Chest x-ray showed bilateral basilar opacities. Pt was intubated in the MICU. Pt was weaned to room air. Patient was able to be transferred out of MICU and was stable in RNF. Wound care was ordered for decubitus ulcer. Patient was discharged to skilled nursing facility.         Past Medical History:   Diagnosis Date   • Anemia    • ESRD (end stage renal disease) (CMS/[facility])    • GERD (gastroesophageal reflux disease)    • Hypertension, essential    • Malnutrition (CMS/[facility])    • RA (rheumatoid arthritis) (CMS/[facility])        No past surgical history on file.    Family History   Problem Relation Name Age of Onset   • No Known Problems Mother     • No Known Problems Father         Social History     Socioeconomic History   • Marital status:      Spouse name: Not on file   • Number of children: Not on file   • Years of education: Not on file   • Highest education level: Not on file   Occupational History   • Not on file   Tobacco Use   • Smoking status: Not on file   • Smokeless tobacco: Not on file   Substance and Sexual Activity   • Alcohol use: Not on file   • Drug use: Not on file   • Sexual activity: Not on file   Other Topics Concern   • Not on file   Social History Narrative   • Not on file     Social Determinants of Health     Financial Resource Strain: Not on file   Food Insecurity: Not on file   Transportation Needs: Not on file   Physical Activity: Not on file   Stress: Not on file   Social Connections: Not on file   Intimate Partner  Violence: Not on file   Housing Stability: Not on file       Vital signs: 99/61, 98.2, 88, 18, 95%    Objective  Physical Exam  Vitals reviewed.   Constitutional:       Appearance: Normal appearance.   HENT:      Head: Normocephalic and atraumatic.   Cardiovascular:      Rate and Rhythm: Normal rate and regular rhythm.   Pulmonary:      Effort: Pulmonary effort is normal.      Breath sounds: Normal breath sounds.   Abdominal:      General: Bowel sounds are normal.      Palpations: Abdomen is soft.      Comments: Perirectal wound is clean covered with dressing there is a rectal tube   Musculoskeletal:      Cervical back: Neck supple.   Skin:     General: Skin is warm and dry.   Neurological:      General: No focal deficit present.      Mental Status: She is alert.   Psychiatric:         Mood and Affect: Mood normal.         Behavior: Behavior is cooperative.         Assessment/Plan  Problem List Items Addressed This Visit       End stage renal disease (CMS/HCC)    Necrotizing fasciitis (CMS/HCC)    Anemia due to chronic kidney disease    Secondary hypertension - Primary    Weakness     Medications, treatments, and labs reviewed  Continue medications and treatments as listed in [facility]    Scribe Attestation  I, Raine Mckeon Scribe   attest that this documentation has been prepared under the direction and in the presence of Sridevi Mitchell MD.    Provider Attestation - Scribe documentation  All medical record entries made by the Scribe were at my direction and personally dictated by me. I have reviewed the chart and agree that the record accurately reflects my personal performance of the history, physical exam, discussion and plan.    Sridevi Mitchell MD          Electronically Signed By: Sridevi Mitchell MD   8/4/23  4:51 PM

## 2023-08-04 NOTE — PROGRESS NOTES
HISTORY & PHYSICAL    Subjective   Chief complaint: Afua Hilario is a 83 y.o. female who is a acute skilled care patient being seen and evaluated for multiple medical problems.  Patient presents for weakness.    HPI:  Patient was presented to the ED with abdominal pain. Patient was given IV fluids and started on broad spectrum antibiotics. CT abdomen pelvis showed possible changes of pancreatitis. Chest x-ray showed bilateral basilar opacities. Pt was intubated in the MICU. Pt was weaned to room air. Patient was able to be transferred out of MICU and was stable in RNF. Wound care was ordered for decubitus ulcer. Patient was discharged to skilled nursing facility.         Past Medical History:   Diagnosis Date    Anemia     ESRD (end stage renal disease) (CMS/[facility])     GERD (gastroesophageal reflux disease)     Hypertension, essential     Malnutrition (CMS/[facility])     RA (rheumatoid arthritis) (CMS/[facility])        No past surgical history on file.    Family History   Problem Relation Name Age of Onset    No Known Problems Mother      No Known Problems Father         Social History     Socioeconomic History    Marital status:      Spouse name: Not on file    Number of children: Not on file    Years of education: Not on file    Highest education level: Not on file   Occupational History    Not on file   Tobacco Use    Smoking status: Not on file    Smokeless tobacco: Not on file   Substance and Sexual Activity    Alcohol use: Not on file    Drug use: Not on file    Sexual activity: Not on file   Other Topics Concern    Not on file   Social History Narrative    Not on file     Social Determinants of Health     Financial Resource Strain: Not on file   Food Insecurity: Not on file   Transportation Needs: Not on file   Physical Activity: Not on file   Stress: Not on file   Social Connections: Not on file   Intimate Partner Violence: Not on file   Housing Stability: Not on file       Vital signs: 99/61, 98.2, 88, 18,  95%    Objective   Physical Exam  Vitals reviewed.   Constitutional:       Appearance: Normal appearance.   HENT:      Head: Normocephalic and atraumatic.   Cardiovascular:      Rate and Rhythm: Normal rate and regular rhythm.   Pulmonary:      Effort: Pulmonary effort is normal.      Breath sounds: Normal breath sounds.   Abdominal:      General: Bowel sounds are normal.      Palpations: Abdomen is soft.      Comments: Perirectal wound is clean covered with dressing there is a rectal tube   Musculoskeletal:      Cervical back: Neck supple.   Skin:     General: Skin is warm and dry.   Neurological:      General: No focal deficit present.      Mental Status: She is alert.   Psychiatric:         Mood and Affect: Mood normal.         Behavior: Behavior is cooperative.         Assessment/Plan   Problem List Items Addressed This Visit       End stage renal disease (CMS/HCC)    Necrotizing fasciitis (CMS/HCC)    Anemia due to chronic kidney disease    Secondary hypertension - Primary    Weakness     Medications, treatments, and labs reviewed  Continue medications and treatments as listed in [facility]    Scribe Attestation  Raine KWOK Scribe   attest that this documentation has been prepared under the direction and in the presence of Sridevi Mitchell MD.    Provider Attestation - Scribe documentation  All medical record entries made by the Scribe were at my direction and personally dictated by me. I have reviewed the chart and agree that the record accurately reflects my personal performance of the history, physical exam, discussion and plan.    Sridevi Mitchell MD

## 2023-08-07 NOTE — LETTER
Patient: Afua Hilario  : 1940    Encounter Date: 2023    PROGRESS NOTE    Subjective  Chief complaint: Afua Hilario is a 83 y.o. female who is a acute skilled care patient being seen and evaluated for weakness.    HPI:  Patient with PMH of HTN, rheumatoid arthritis, anemia and GERD admitted to SNF for therapy d/t weakness after recent hospitalization.  Patient requires assist with ADLs and transfers.  She is total dependent for mobility.  Therapy to eval and treat.  Patient is stable and has no new complaints at this time.  No new concerns reported by nursing.        Objective  Vital signs:  18, 90/58, 98.1, 95, 96%  Physical Exam  Vitals reviewed.   Constitutional:       General: She is not in acute distress.  Eyes:      Extraocular Movements: Extraocular movements intact.   Cardiovascular:      Rate and Rhythm: Normal rate and regular rhythm.   Pulmonary:      Effort: Pulmonary effort is normal.      Breath sounds: Normal breath sounds.   Abdominal:      Comments: NG tube  Rectal tube   Musculoskeletal:      Cervical back: Neck supple.      Right lower leg: No edema.      Left lower leg: No edema.      Comments: Generalized weakness   Psychiatric:         Mood and Affect: Mood normal.         Behavior: Behavior is cooperative.         Assessment/Plan  Problem List Items Addressed This Visit       End stage renal disease (CMS/HCC)     HD per nephrology  Renal diet         Hypertension, essential     BP at goal  Continue antihypertensives  Monitor BP         Weakness - Primary     Therapy to eval and treat          Medications, treatments, and labs reviewed  Continue medications and treatments as listed in EMR    Scribe Attestation  IChristi Scribe   attest that this documentation has been prepared under the direction and in the presence of BUBBA Garcia-CNP    Provider Attestation - Scribe documentation  All medical record entries made by the Scribe were at my direction and  personally dictated by me. I have reviewed the chart and agree that the record accurately reflects my personal performance of the history, physical exam, discussion and plan.   JOHN Garcia        Electronically Signed By: JOHN Garcia   8/17/23  3:02 PM

## 2023-08-08 NOTE — PROGRESS NOTES
PROGRESS NOTE    Subjective   Chief complaint: Afua Hilario is a 83 y.o. female who is an acute skilled patient being seen and evaluated for weakness    HPI:  Patient continues to work in therapy due to weakness and debility. Requires assistance for transfers and Adl's. Patient with ESRD continues on HD and tolerating well. No new issues at this time. No acute distress.       Objective   Vital signs: 126/76, 98%    Physical Exam  Constitutional:       General: She is not in acute distress.  Eyes:      Extraocular Movements: Extraocular movements intact.   Cardiovascular:      Rate and Rhythm: Normal rate and regular rhythm.   Pulmonary:      Effort: Pulmonary effort is normal.      Breath sounds: Normal breath sounds.   Abdominal:      General: Bowel sounds are normal.      Palpations: Abdomen is soft.   Musculoskeletal:      Cervical back: Neck supple.      Right lower leg: No edema.      Left lower leg: No edema.   Neurological:      Mental Status: She is alert.   Psychiatric:         Mood and Affect: Mood normal.         Behavior: Behavior is cooperative.         Assessment/Plan   Problem List Items Addressed This Visit       End stage renal disease (CMS/[facility])     HD per nephrology  Renal diet         Weakness - Primary     Continue working with therapy          Medications, treatments, and labs reviewed  Continue medications and treatments as listed in [facility]    Scribe Attestation  By signing my name below, IKimberlee Scribe   attest that this documentation has been prepared under the direction and in the presence of Sridevi Mitchell MD.    Provider Attestation - Scribe documentation  All medical record entries made by the Scribe were at my direction and personally dictated by me. I have reviewed the chart and agree that the record accurately reflects my personal performance of the history, physical exam, discussion and plan.      1. Weakness        2. End stage renal disease (CMS/HCC)

## 2023-08-08 NOTE — LETTER
Patient: Afua Hilario  : 1940    Encounter Date: 2023    PROGRESS NOTE    Subjective  Chief complaint: Afua Hilario is a 83 y.o. female who is an acute skilled patient being seen and evaluated for weakness    HPI:  Patient continues to work in therapy due to weakness and debility. Requires assistance for transfers and Adl's. Patient with ESRD continues on HD and tolerating well. No new issues at this time. No acute distress.       Objective  Vital signs: 126/76, 98%    Physical Exam  Constitutional:       General: She is not in acute distress.  Eyes:      Extraocular Movements: Extraocular movements intact.   Cardiovascular:      Rate and Rhythm: Normal rate and regular rhythm.   Pulmonary:      Effort: Pulmonary effort is normal.      Breath sounds: Normal breath sounds.   Abdominal:      General: Bowel sounds are normal.      Palpations: Abdomen is soft.   Musculoskeletal:      Cervical back: Neck supple.      Right lower leg: No edema.      Left lower leg: No edema.   Neurological:      Mental Status: She is alert.   Psychiatric:         Mood and Affect: Mood normal.         Behavior: Behavior is cooperative.         Assessment/Plan  Problem List Items Addressed This Visit       End stage renal disease (CMS/[facility])     HD per nephrology  Renal diet         Weakness - Primary     Continue working with therapy          Medications, treatments, and labs reviewed  Continue medications and treatments as listed in PCC    Scribe Attestation  By signing my name below, I, Melissa Booth   attest that this documentation has been prepared under the direction and in the presence of Sridevi Mitchell MD.    Provider Attestation - Scribe documentation  All medical record entries made by the Scribe were at my direction and personally dictated by me. I have reviewed the chart and agree that the record accurately reflects my personal performance of the history, physical exam, discussion and plan.      1.  Weakness        2. End stage renal disease (CMS/HCC)              Electronically Signed By: Sridevi Mitchell MD   8/8/23  2:11 PM

## 2023-08-09 NOTE — LETTER
Patient: Afua Hilario  : 1940    Encounter Date: 2023    PROGRESS NOTE    Subjective  Chief complaint: Afua Hilario is a 83 y.o. female who is a acute skilled care patient being seen and evaluated for weakness.    HPI:  23  Patient was presented to the ED with abdominal pain. Patient was given IV fluids and started on broad spectrum antibiotics. CT abdomen pelvis showed possible changes of pancreatitis. Chest x-ray showed bilateral basilar opacities. Pt was intubated in the MICU. Pt was weaned to room air. Patient was able to be transferred out of MICU and was stable in RNF. Wound care was ordered for decubitus ulcer. Patient was discharged to skilled nursing facility.     23  Patient with PMH of HTN, rheumatoid arthritis, anemia and GERD admitted to SNF for therapy d/t weakness after recent hospitalization.  Patient requires assist with ADLs and transfers.  She is total dependent for mobility.  Therapy to eval and treat.  Patient is stable and has no new complaints at this time.  No new concerns reported by nursing.      23  Patient continues to work in therapy due to weakness and debility. Requires assistance for transfers and Adl's. Patient with ESRD continues on HD and tolerating well. No new issues at this time. No acute distress.     23  Patient in therapy d/t generalized weakness.  Patient presents for f/u.  Therapy is working with patient on swallowing, bed mobility and ADLs.  No new concerns reported by nursing.        Objective  Vital signs:  18, 90/58, 98.1, 95, 96%  Physical Exam  Vitals reviewed.   Constitutional:       General: She is not in acute distress.  Eyes:      Extraocular Movements: Extraocular movements intact.   Cardiovascular:      Rate and Rhythm: Normal rate and regular rhythm.   Pulmonary:      Effort: Pulmonary effort is normal.      Breath sounds: Normal breath sounds.   Abdominal:      Comments: NG tube  Rectal tube   Musculoskeletal:       Cervical back: Neck supple.      Right lower leg: No edema.      Left lower leg: No edema.      Comments: Generalized weakness   Psychiatric:         Mood and Affect: Mood normal.         Behavior: Behavior is cooperative.         Assessment/Plan  Problem List Items Addressed This Visit       End stage renal disease (CMS/[facility])     HD per nephrology  Tolerating tx  Renal diet         Hypertension, essential     BP at goal  Continue antihypertensives  Monitor BP         Necrotizing fasciitis (CMS/[facility])     Wound doctor was consulted upon admission and is responsible for the evaluation and comprehensive management of of the wound including appropriate control of complicating factors such as unrelieved pressure, infection, vascular and/or uncontrolled metabolic derangement, and/or nutritional deficiency in addition to appropriate debridement            Weakness - Primary     Patient is nonambulatory  Continue with therapy           Medications, treatments, and labs reviewed  Continue medications and treatments as listed in EMR    Scribe Attestation  IChristi Scribe   attest that this documentation has been prepared under the direction and in the presence of JOHN Garcia    Provider Attestation - Scribe documentation  All medical record entries made by the Scribe were at my direction and personally dictated by me. I have reviewed the chart and agree that the record accurately reflects my personal performance of the history, physical exam, discussion and plan.   JOHN Garcia        Electronically Signed By: JOHN Garcia   8/19/23 11:39 AM

## 2023-08-10 NOTE — LETTER
Patient: Afua Hilario  : 1940    Encounter Date: 08/10/2023    PROGRESS NOTE    Subjective  Chief complaint: Afua Hilario is a 83 y.o. female who is a acute skilled care patient being seen and evaluated for weakness.    HPI:  23  Patient was presented to the ED with abdominal pain. Patient was given IV fluids and started on broad spectrum antibiotics. CT abdomen pelvis showed possible changes of pancreatitis. Chest x-ray showed bilateral basilar opacities. Pt was intubated in the MICU. Pt was weaned to room air. Patient was able to be transferred out of MICU and was stable in RNF. Wound care was ordered for decubitus ulcer. Patient was discharged to skilled nursing facility.     23  Patient with PMH of HTN, rheumatoid arthritis, anemia and GERD admitted to SNF for therapy d/t weakness after recent hospitalization.  Patient requires assist with ADLs and transfers.  She is total dependent for mobility.  Therapy to eval and treat.  Patient is stable and has no new complaints at this time.  No new concerns reported by nursing.      23  Patient continues to work in therapy due to weakness and debility. Requires assistance for transfers and Adl's. Patient with ESRD continues on HD and tolerating well. No new issues at this time. No acute distress.     23  Patient in therapy d/t generalized weakness.  Patient presents for f/u.  Therapy is working with patient on swallowing, bed mobility and ADLs.  No new concerns reported by nursing.      8/10/23   Patient skilled for therapy due to weakness  is working in therapy to improve gross motor coordination, bed mobility and transfer training.  Patient is stable.  Denies n/v/f/c.  No new concerns reported by nursing.        Objective  Vital signs: 90/58  Physical Exam  Vitals reviewed.   Constitutional:       General: She is not in acute distress.  Eyes:      Extraocular Movements: Extraocular movements intact.   Cardiovascular:      Rate and  Rhythm: Normal rate and regular rhythm.   Pulmonary:      Effort: Pulmonary effort is normal.      Breath sounds: Normal breath sounds.   Abdominal:      Comments: NG tube  Rectal tube   Musculoskeletal:      Cervical back: Neck supple.      Right lower leg: No edema.      Left lower leg: No edema.      Comments: Generalized weakness   Psychiatric:         Mood and Affect: Mood normal.         Behavior: Behavior is cooperative.         Assessment/Plan  Problem List Items Addressed This Visit       End stage renal disease (CMS/HCC)     HD per nephrology  Tolerating tx  Renal diet         Hypertension, essential     BP at goal  Continue antihypertensives  Monitor BP         Weakness - Primary     Patient is nonambulatory  Continue working with therapy           Medications, treatments, and labs reviewed  Continue medications and treatments as listed in EMR    Scribe Attestation  IChristi Scriblaura   attest that this documentation has been prepared under the direction and in the presence of JOHN Garcia    Provider Attestation - Scribe documentation  All medical record entries made by the Scribe were at my direction and personally dictated by me. I have reviewed the chart and agree that the record accurately reflects my personal performance of the history, physical exam, discussion and plan.   JOHN Garcia        Electronically Signed By: JOHN Garcia   8/22/23  1:34 PM

## 2023-08-11 NOTE — LETTER
Patient: Afua Hilario  : 1940    Encounter Date: 2023    PROGRESS NOTE    Subjective  Chief complaint: Afua Hilario is a 83 y.o. female who is an acute skilled patient being seen and evaluated for weakness    HPI:  Patient continues to work in therapy due to weakness and debility. Requires   Moderate assistance for transfers and Adl's. Patient with ESRD continues on HD and tolerating well. No new issues at this time. No acute distress.       Objective  Vital signs: 133/74, 98%    Physical Exam  Constitutional:       General: She is not in acute distress.  Eyes:      Extraocular Movements: Extraocular movements intact.   Cardiovascular:      Rate and Rhythm: Normal rate and regular rhythm.   Pulmonary:      Effort: Pulmonary effort is normal.      Breath sounds: Normal breath sounds.   Abdominal:      General: Bowel sounds are normal.      Palpations: Abdomen is soft.   Musculoskeletal:      Cervical back: Neck supple.      Right lower leg: No edema.      Left lower leg: No edema.   Neurological:      Mental Status: She is alert.   Psychiatric:         Mood and Affect: Mood normal.         Behavior: Behavior is cooperative.         Assessment/Plan  Problem List Items Addressed This Visit       End stage renal disease (CMS/[facility]) - Primary     HD per nephrology  Renal diet         Weakness     Continue working with therapy        Medications, treatments, and labs reviewed  Continue medications and treatments as listed in PCC    Scribe Attestation  By signing my name below, I, Melissa Booth   attest that this documentation has been prepared under the direction and in the presence of Sridevi Mitchell MD.    Provider Attestation - Scribe documentation  All medical record entries made by the Scribe were at my direction and personally dictated by me. I have reviewed the chart and agree that the record accurately reflects my personal performance of the history, physical exam, discussion and  plan.      1. End stage renal disease (CMS/HCC)        2. Weakness              Electronically Signed By: Sridevi Mitchell MD   8/11/23  2:44 PM

## 2023-08-11 NOTE — PROGRESS NOTES
PROGRESS NOTE    Subjective   Chief complaint: Afua Hilario is a 83 y.o. female who is an acute skilled patient being seen and evaluated for weakness    HPI:  Patient continues to work in therapy due to weakness and debility. Requires   Moderate assistance for transfers and Adl's. Patient with ESRD continues on HD and tolerating well. No new issues at this time. No acute distress.       Objective   Vital signs: 133/74, 98%    Physical Exam  Constitutional:       General: She is not in acute distress.  Eyes:      Extraocular Movements: Extraocular movements intact.   Cardiovascular:      Rate and Rhythm: Normal rate and regular rhythm.   Pulmonary:      Effort: Pulmonary effort is normal.      Breath sounds: Normal breath sounds.   Abdominal:      General: Bowel sounds are normal.      Palpations: Abdomen is soft.   Musculoskeletal:      Cervical back: Neck supple.      Right lower leg: No edema.      Left lower leg: No edema.   Neurological:      Mental Status: She is alert.   Psychiatric:         Mood and Affect: Mood normal.         Behavior: Behavior is cooperative.         Assessment/Plan   Problem List Items Addressed This Visit       End stage renal disease (CMS/[facility]) - Primary     HD per nephrology  Renal diet         Weakness     Continue working with therapy        Medications, treatments, and labs reviewed  Continue medications and treatments as listed in [facility]    Scribe Attestation  By signing my name below, IKimberlee Scribe   attest that this documentation has been prepared under the direction and in the presence of Sridevi Mitchell MD.    Provider Attestation - Scribe documentation  All medical record entries made by the Scribe were at my direction and personally dictated by me. I have reviewed the chart and agree that the record accurately reflects my personal performance of the history, physical exam, discussion and plan.      1. End stage renal disease (CMS/HCC)        2. Weakness

## 2023-08-14 NOTE — LETTER
Patient: Afua Hilario  : 1940    Encounter Date: 2023    PROGRESS NOTE    Subjective  Chief complaint: Afua Hilario is a 83 y.o. female who is a acute skilled care patient being seen and evaluated for weakness.    HPI:  23  Patient was presented to the ED with abdominal pain. Patient was given IV fluids and started on broad spectrum antibiotics. CT abdomen pelvis showed possible changes of pancreatitis. Chest x-ray showed bilateral basilar opacities. Pt was intubated in the MICU. Pt was weaned to room air. Patient was able to be transferred out of MICU and was stable in RNF. Wound care was ordered for decubitus ulcer. Patient was discharged to skilled nursing facility.     23  Patient with PMH of HTN, rheumatoid arthritis, anemia and GERD admitted to SNF for therapy d/t weakness after recent hospitalization.  Patient requires assist with ADLs and transfers.  She is total dependent for mobility.  Therapy to eval and treat.  Patient is stable and has no new complaints at this time.  No new concerns reported by nursing.      23  Patient continues to work in therapy due to weakness and debility. Requires assistance for transfers and Adl's. Patient with ESRD continues on HD and tolerating well. No new issues at this time. No acute distress.     23  Patient in therapy d/t generalized weakness.  Patient presents for f/u.  Therapy is working with patient on swallowing, bed mobility and ADLs.  No new concerns reported by nursing.      8/10/23   Patient skilled for therapy due to weakness  is working in therapy to improve gross motor coordination, bed mobility and transfer training.  Patient is stable.  Denies n/v/f/c.  No new concerns reported by nursing.    23  Patient continues to work in therapy due to weakness and debility. Requires   Moderate assistance for transfers and Adl's. Patient with ESRD continues on HD and tolerating well. No new issues at this time. No acute  distress.     8/14/23  Patient skilled for ST.  Is total dependent for all care.  ST attempted analysis of tolerance of ice chips.  Patient became agitated and refused.  Unable to answer simple yes/no questions.  No new concerns reported by nursing.        Objective  Vital signs: 18, 115/70, 98.1, 73, 97%  Physical Exam  Vitals reviewed.   Constitutional:       General: She is not in acute distress.  Eyes:      Extraocular Movements: Extraocular movements intact.   Cardiovascular:      Rate and Rhythm: Normal rate and regular rhythm.   Pulmonary:      Effort: Pulmonary effort is normal.      Breath sounds: Normal breath sounds.   Abdominal:      Comments: NG tube   Musculoskeletal:      Cervical back: Neck supple.      Right lower leg: No edema.      Left lower leg: No edema.      Comments: Generalized weakness   Psychiatric:         Mood and Affect: Mood normal.         Behavior: Behavior is cooperative.         Assessment/Plan  Problem List Items Addressed This Visit       End stage renal disease (CMS/HCC)     HD per nephrology  Tolerating tx  Renal diet         Hypertension, essential     BP at goal  Continue antihypertensives  Monitor BP         Weakness - Primary     Patient is nonambulatory  Requires wheelchair for mobility  Continue working with therapy           Medications, treatments, and labs reviewed  Continue medications and treatments as listed in EMR    Scribe Attestation  Christi KWOK Scribe   attest that this documentation has been prepared under the direction and in the presence of JOHN Garcia    Provider Attestation - Scribe documentation  All medical record entries made by the Scribe were at my direction and personally dictated by me. I have reviewed the chart and agree that the record accurately reflects my personal performance of the history, physical exam, discussion and plan.   JOHN Garcia        Electronically Signed By: JOHN Garcia    8/22/23  2:34 PM

## 2023-08-14 NOTE — PROGRESS NOTES
PROGRESS NOTE    Subjective   Chief complaint: Afua Hilario is a 83 y.o. female who is a acute skilled care patient being seen and evaluated for weakness.    HPI:  Patient with PMH of HTN, rheumatoid arthritis, anemia and GERD admitted to SNF for therapy d/t weakness after recent hospitalization.  Patient requires assist with ADLs and transfers.  She is total dependent for mobility.  Therapy to eval and treat.  Patient is stable and has no new complaints at this time.  No new concerns reported by nursing.        Objective   Vital signs:  18, 90/58, 98.1, 95, 96%  Physical Exam  Vitals reviewed.   Constitutional:       General: She is not in acute distress.  Eyes:      Extraocular Movements: Extraocular movements intact.   Cardiovascular:      Rate and Rhythm: Normal rate and regular rhythm.   Pulmonary:      Effort: Pulmonary effort is normal.      Breath sounds: Normal breath sounds.   Abdominal:      Comments: NG tube  Rectal tube   Musculoskeletal:      Cervical back: Neck supple.      Right lower leg: No edema.      Left lower leg: No edema.      Comments: Generalized weakness   Psychiatric:         Mood and Affect: Mood normal.         Behavior: Behavior is cooperative.         Assessment/Plan   Problem List Items Addressed This Visit       End stage renal disease (CMS/[facility])     HD per nephrology  Renal diet         Hypertension, essential     BP at goal  Continue antihypertensives  Monitor BP         Weakness - Primary     Therapy to eval and treat          Medications, treatments, and labs reviewed  Continue medications and treatments as listed in EMR    Scribe Attestation  Christi KWOK Scribe   attest that this documentation has been prepared under the direction and in the presence of BUBBA Garcia-CNP    Provider Attestation - Scribe documentation  All medical record entries made by the Scribe were at my direction and personally dictated by me. I have reviewed the chart and agree that the  record accurately reflects my personal performance of the history, physical exam, discussion and plan.   Heather Sierra, APRN-CNP

## 2023-08-15 NOTE — LETTER
Patient: Afua Hilario  : 1940    Encounter Date: 08/15/2023    PROGRESS NOTE    Subjective  Chief complaint: Afua Hilario is a 83 y.o. female who is a long term care patient being seen and evaluated for monthly general medical care and follow-up.    HPI:   patient presents for general medical care and f/u.  Patient seen and examined at bedside.  No issues per nursing.  Patient has no acute complaints.  Patient continues to work in therapy due to weakness and debility.  Requires assistance for transfers ADLs and mobility.  HTN BP at goal.  Denies chest pain and headache.   anemia stable, denies fatigue, sob, and palpitations.   Patient with ESRD continues on HD tolerating it well.  No acute distress.      Objective  Vital signs:   136/82, 98%    Physical Exam  Constitutional:       General: She is not in acute distress.  Eyes:      Extraocular Movements: Extraocular movements intact.   Cardiovascular:      Rate and Rhythm: Normal rate and regular rhythm.   Pulmonary:      Effort: Pulmonary effort is normal.      Breath sounds: Normal breath sounds.   Abdominal:      General: Bowel sounds are normal.      Palpations: Abdomen is soft.   Genitourinary:     Comments: Rectal tube    Musculoskeletal:      Cervical back: Neck supple.      Right lower leg: No edema.      Left lower leg: No edema.   Neurological:      Mental Status: She is alert.   Psychiatric:         Mood and Affect: Mood normal.         Behavior: Behavior is cooperative.         Assessment/Plan  Problem List Items Addressed This Visit       End stage renal disease (CMS/HCC)     HD per nephrology  Renal diet         Anemia due to chronic kidney disease     Stable  Monitor labs         Secondary hypertension     BP at goal  Continue antihypertensives  Monitor BP         Weakness - Primary     Continue working with therapy          Medications, treatments, and labs reviewed  Continue medications and treatments as listed in PCC    Scribe  Attestation  By signing my name below, I, Melissa Booth   attest that this documentation has been prepared under the direction and in the presence of Sridevi Mitchell MD.    Provider Attestation - Scribe documentation  All medical record entries made by the Scribe were at my direction and personally dictated by me. I have reviewed the chart and agree that the record accurately reflects my personal performance of the history, physical exam, discussion and plan.    1. Weakness        2. Secondary hypertension        3. Anemia due to chronic kidney disease, unspecified CKD stage        4. End stage renal disease (CMS/[facility])               Electronically Signed By: Sridevi Mitchell MD   8/15/23  4:45 PM

## 2023-08-15 NOTE — PROGRESS NOTES
PROGRESS NOTE    Subjective   Chief complaint: Afua Hilario is a 83 y.o. female who is a long term care patient being seen and evaluated for monthly general medical care and follow-up.    HPI:   patient presents for general medical care and f/u.  Patient seen and examined at bedside.  No issues per nursing.  Patient has no acute complaints.  Patient continues to work in therapy due to weakness and debility.  Requires assistance for transfers ADLs and mobility.  HTN BP at goal.  Denies chest pain and headache.   anemia stable, denies fatigue, sob, and palpitations.   Patient with ESRD continues on HD tolerating it well.  No acute distress.      Objective   Vital signs:   136/82, 98%    Physical Exam  Constitutional:       General: She is not in acute distress.  Eyes:      Extraocular Movements: Extraocular movements intact.   Cardiovascular:      Rate and Rhythm: Normal rate and regular rhythm.   Pulmonary:      Effort: Pulmonary effort is normal.      Breath sounds: Normal breath sounds.   Abdominal:      General: Bowel sounds are normal.      Palpations: Abdomen is soft.   Genitourinary:     Comments: Rectal tube    Musculoskeletal:      Cervical back: Neck supple.      Right lower leg: No edema.      Left lower leg: No edema.   Neurological:      Mental Status: She is alert.   Psychiatric:         Mood and Affect: Mood normal.         Behavior: Behavior is cooperative.         Assessment/Plan   Problem List Items Addressed This Visit       End stage renal disease (CMS/[facility])     HD per nephrology  Renal diet         Anemia due to chronic kidney disease     Stable  Monitor labs         Secondary hypertension     BP at goal  Continue antihypertensives  Monitor BP         Weakness - Primary     Continue working with therapy          Medications, treatments, and labs reviewed  Continue medications and treatments as listed in [facility]    Scribe Attestation  By signing my name below, I, Kimberlee Sun, Scribe   attest  that this documentation has been prepared under the direction and in the presence of Sridevi Mitchell MD.    Provider Attestation - Scribe documentation  All medical record entries made by the Scribe were at my direction and personally dictated by me. I have reviewed the chart and agree that the record accurately reflects my personal performance of the history, physical exam, discussion and plan.    1. Weakness        2. Secondary hypertension        3. Anemia due to chronic kidney disease, unspecified CKD stage        4. End stage renal disease (CMS/[facility])

## 2023-08-16 NOTE — LETTER
Patient: Afua Hilario  : 1940    Encounter Date: 2023    PROGRESS NOTE    Subjective  Chief complaint: Afua Hilario is a 83 y.o. female who is a acute skilled care patient being seen and evaluated for weakness.    HPI:  23  Patient was presented to the ED with abdominal pain. Patient was given IV fluids and started on broad spectrum antibiotics. CT abdomen pelvis showed possible changes of pancreatitis. Chest x-ray showed bilateral basilar opacities. Pt was intubated in the MICU. Pt was weaned to room air. Patient was able to be transferred out of MICU and was stable in RNF. Wound care was ordered for decubitus ulcer. Patient was discharged to skilled nursing facility.     23  Patient with PMH of HTN, rheumatoid arthritis, anemia and GERD admitted to SNF for therapy d/t weakness after recent hospitalization.  Patient requires assist with ADLs and transfers.  She is total dependent for mobility.  Therapy to eval and treat.  Patient is stable and has no new complaints at this time.  No new concerns reported by nursing.      23  Patient continues to work in therapy due to weakness and debility. Requires assistance for transfers and Adl's. Patient with ESRD continues on HD and tolerating well. No new issues at this time. No acute distress.     23  Patient in therapy d/t generalized weakness.  Patient presents for f/u.  Therapy is working with patient on swallowing, bed mobility and ADLs.  No new concerns reported by nursing.      8/10/23   Patient skilled for therapy due to weakness  is working in therapy to improve gross motor coordination, bed mobility and transfer training.  Patient is stable.  Denies n/v/f/c.  No new concerns reported by nursing.    23  Patient continues to work in therapy due to weakness and debility. Requires   Moderate assistance for transfers and Adl's. Patient with ESRD continues on HD and tolerating well. No new issues at this time. No acute  distress.     8/14/23  Patient skilled for ST.  Is total dependent for all care.  ST attempted analysis of tolerance of ice chips.  Patient became agitated and refused.  Unable to answer simple yes/no questions.  No new concerns reported by nursing.      8/15/23  Patient presents for general medical care and f/u.  Patient seen and examined at bedside.  No issues per nursing.  Patient has no acute complaints.  Patient continues to work in therapy due to weakness and debility.  Requires assistance for transfers ADLs and mobility.  HTN BP at goal.  Denies chest pain and headache.   anemia stable, denies fatigue, sob, and palpitations.   Patient with ESRD continues on HD tolerating it well.  No acute distress.    8/16/23   Patient continues to working in PT/OT/ST.  Patient is stable without complaint.  Tolerating tube feed.  Denies n/v/f/c.  No new concerns reported by staff.        Objective  Vital signs:   20, 147/88, 98.0, 74, 98%  Physical Exam  Vitals reviewed.   Constitutional:       General: She is not in acute distress.  Cardiovascular:      Rate and Rhythm: Normal rate and regular rhythm.   Pulmonary:      Effort: Pulmonary effort is normal.      Breath sounds: Normal breath sounds.   Abdominal:      Comments: NG tube   Musculoskeletal:      Cervical back: Neck supple.      Right lower leg: No edema.      Left lower leg: No edema.      Comments: Generalized weakness   Psychiatric:         Behavior: Behavior is cooperative.         Assessment/Plan  Problem List Items Addressed This Visit       End stage renal disease (CMS/HCC)     HD per nephrology  Tolerating tx  Renal diet         Hypertension, essential     Continue antihypertensives  Monitor BP         Weakness - Primary     Patient is nonambulatory  Requires wheelchair for mobility  Continue working with therapy           Medications, treatments, and labs reviewed  Continue medications and treatments as listed in EMR    Scribe Attestation  I, Christi Chen ,  Scribe   attest that this documentation has been prepared under the direction and in the presence of JOHN Garcia    Provider Attestation - Scribe documentation  All medical record entries made by the Scribe were at my direction and personally dictated by me. I have reviewed the chart and agree that the record accurately reflects my personal performance of the history, physical exam, discussion and plan.   JOHN Garcia        Electronically Signed By: JOHN Garcia   9/6/23  2:02 PM

## 2023-08-17 PROBLEM — I10 HYPERTENSION, ESSENTIAL: Status: ACTIVE | Noted: 2023-01-01

## 2023-08-17 NOTE — PROGRESS NOTES
PROGRESS NOTE    Subjective   Chief complaint: Afua Hilario is a 83 y.o. female who is a acute skilled care patient being seen and evaluated for weakness.    HPI:  8/4/23  Patient was presented to the ED with abdominal pain. Patient was given IV fluids and started on broad spectrum antibiotics. CT abdomen pelvis showed possible changes of pancreatitis. Chest x-ray showed bilateral basilar opacities. Pt was intubated in the MICU. Pt was weaned to room air. Patient was able to be transferred out of MICU and was stable in RNF. Wound care was ordered for decubitus ulcer. Patient was discharged to skilled nursing facility.     8/7/23  Patient with PMH of HTN, rheumatoid arthritis, anemia and GERD admitted to SNF for therapy d/t weakness after recent hospitalization.  Patient requires assist with ADLs and transfers.  She is total dependent for mobility.  Therapy to eval and treat.  Patient is stable and has no new complaints at this time.  No new concerns reported by nursing.      8/8/23  Patient continues to work in therapy due to weakness and debility. Requires assistance for transfers and Adl's. Patient with ESRD continues on HD and tolerating well. No new issues at this time. No acute distress.     8/9/23  Patient in therapy d/t generalized weakness.  Patient presents for f/u.  Therapy is working with patient on swallowing, bed mobility and ADLs.  No new concerns reported by nursing.        Objective   Vital signs:  18, 90/58, 98.1, 95, 96%  Physical Exam  Vitals reviewed.   Constitutional:       General: She is not in acute distress.  Eyes:      Extraocular Movements: Extraocular movements intact.   Cardiovascular:      Rate and Rhythm: Normal rate and regular rhythm.   Pulmonary:      Effort: Pulmonary effort is normal.      Breath sounds: Normal breath sounds.   Abdominal:      Comments: NG tube  Rectal tube   Musculoskeletal:      Cervical back: Neck supple.      Right lower leg: No edema.      Left lower  leg: No edema.      Comments: Generalized weakness   Psychiatric:         Mood and Affect: Mood normal.         Behavior: Behavior is cooperative.         Assessment/Plan   Problem List Items Addressed This Visit       End stage renal disease (CMS/[facility])     HD per nephrology  Tolerating tx  Renal diet         Hypertension, essential     BP at goal  Continue antihypertensives  Monitor BP         Necrotizing fasciitis (CMS/[facility])     Wound doctor was consulted upon admission and is responsible for the evaluation and comprehensive management of of the wound including appropriate control of complicating factors such as unrelieved pressure, infection, vascular and/or uncontrolled metabolic derangement, and/or nutritional deficiency in addition to appropriate debridement            Weakness - Primary     Patient is nonambulatory  Continue with therapy           Medications, treatments, and labs reviewed  Continue medications and treatments as listed in EMR    Scribe Attestation  IChristi Scribe   attest that this documentation has been prepared under the direction and in the presence of JOHN Garcia    Provider Attestation - Scribe documentation  All medical record entries made by the Scribe were at my direction and personally dictated by me. I have reviewed the chart and agree that the record accurately reflects my personal performance of the history, physical exam, discussion and plan.   JOHN Garcia

## 2023-08-19 NOTE — ASSESSMENT & PLAN NOTE
Wound doctor was consulted upon admission and is responsible for the evaluation and comprehensive management of of the wound including appropriate control of complicating factors such as unrelieved pressure, infection, vascular and/or uncontrolled metabolic derangement, and/or nutritional deficiency in addition to appropriate debridement

## 2023-08-21 NOTE — PROGRESS NOTES
PROGRESS NOTE    Subjective   Chief complaint: Afua Hilario is a 83 y.o. female who is a acute skilled care patient being seen and evaluated for weakness.    HPI:  8/4/23  Patient was presented to the ED with abdominal pain. Patient was given IV fluids and started on broad spectrum antibiotics. CT abdomen pelvis showed possible changes of pancreatitis. Chest x-ray showed bilateral basilar opacities. Pt was intubated in the MICU. Pt was weaned to room air. Patient was able to be transferred out of MICU and was stable in RNF. Wound care was ordered for decubitus ulcer. Patient was discharged to skilled nursing facility.     8/7/23  Patient with PMH of HTN, rheumatoid arthritis, anemia and GERD admitted to SNF for therapy d/t weakness after recent hospitalization.  Patient requires assist with ADLs and transfers.  She is total dependent for mobility.  Therapy to eval and treat.  Patient is stable and has no new complaints at this time.  No new concerns reported by nursing.      8/8/23  Patient continues to work in therapy due to weakness and debility. Requires assistance for transfers and Adl's. Patient with ESRD continues on HD and tolerating well. No new issues at this time. No acute distress.     8/9/23  Patient in therapy d/t generalized weakness.  Patient presents for f/u.  Therapy is working with patient on swallowing, bed mobility and ADLs.  No new concerns reported by nursing.      8/10/23   Patient skilled for therapy due to weakness  is working in therapy to improve gross motor coordination, bed mobility and transfer training.  Patient is stable.  Denies n/v/f/c.  No new concerns reported by nursing.    8/11/23  Patient continues to work in therapy due to weakness and debility. Requires   Moderate assistance for transfers and Adl's. Patient with ESRD continues on HD and tolerating well. No new issues at this time. No acute distress.     8/14/23  Patient skilled for ST.  Is total dependent for all care.   ST attempted analysis of tolerance of ice chips.  Patient became agitated and refused.  Unable to answer simple yes/no questions.  No new concerns reported by nursing.        Objective   Vital signs: 18, 115/70, 98.1, 73, 97%  Physical Exam  Vitals reviewed.   Constitutional:       General: She is not in acute distress.  Eyes:      Extraocular Movements: Extraocular movements intact.   Cardiovascular:      Rate and Rhythm: Normal rate and regular rhythm.   Pulmonary:      Effort: Pulmonary effort is normal.      Breath sounds: Normal breath sounds.   Abdominal:      Comments: NG tube   Musculoskeletal:      Cervical back: Neck supple.      Right lower leg: No edema.      Left lower leg: No edema.      Comments: Generalized weakness   Psychiatric:         Mood and Affect: Mood normal.         Behavior: Behavior is cooperative.         Assessment/Plan   Problem List Items Addressed This Visit       End stage renal disease (CMS/[facility])     HD per nephrology  Tolerating tx  Renal diet         Hypertension, essential     BP at goal  Continue antihypertensives  Monitor BP         Weakness - Primary     Patient is nonambulatory  Requires wheelchair for mobility  Continue working with therapy           Medications, treatments, and labs reviewed  Continue medications and treatments as listed in EMR    Scribe Attestation  IChristi Scribe   attest that this documentation has been prepared under the direction and in the presence of JOHN Garcia    Provider Attestation - Scribe documentation  All medical record entries made by the Scribe were at my direction and personally dictated by me. I have reviewed the chart and agree that the record accurately reflects my personal performance of the history, physical exam, discussion and plan.   JOHN Garcia

## 2023-08-21 NOTE — PROGRESS NOTES
PROGRESS NOTE    Subjective   Chief complaint: Afua Hilario is a 83 y.o. female who is a acute skilled care patient being seen and evaluated for weakness.    HPI:  8/4/23  Patient was presented to the ED with abdominal pain. Patient was given IV fluids and started on broad spectrum antibiotics. CT abdomen pelvis showed possible changes of pancreatitis. Chest x-ray showed bilateral basilar opacities. Pt was intubated in the MICU. Pt was weaned to room air. Patient was able to be transferred out of MICU and was stable in RNF. Wound care was ordered for decubitus ulcer. Patient was discharged to skilled nursing facility.     8/7/23  Patient with PMH of HTN, rheumatoid arthritis, anemia and GERD admitted to SNF for therapy d/t weakness after recent hospitalization.  Patient requires assist with ADLs and transfers.  She is total dependent for mobility.  Therapy to eval and treat.  Patient is stable and has no new complaints at this time.  No new concerns reported by nursing.      8/8/23  Patient continues to work in therapy due to weakness and debility. Requires assistance for transfers and Adl's. Patient with ESRD continues on HD and tolerating well. No new issues at this time. No acute distress.     8/9/23  Patient in therapy d/t generalized weakness.  Patient presents for f/u.  Therapy is working with patient on swallowing, bed mobility and ADLs.  No new concerns reported by nursing.      8/10/23   Patient skilled for therapy due to weakness  is working in therapy to improve gross motor coordination, bed mobility and transfer training.  Patient is stable.  Denies n/v/f/c.  No new concerns reported by nursing.        Objective   Vital signs: 90/58  Physical Exam  Vitals reviewed.   Constitutional:       General: She is not in acute distress.  Eyes:      Extraocular Movements: Extraocular movements intact.   Cardiovascular:      Rate and Rhythm: Normal rate and regular rhythm.   Pulmonary:      Effort: Pulmonary  effort is normal.      Breath sounds: Normal breath sounds.   Abdominal:      Comments: NG tube  Rectal tube   Musculoskeletal:      Cervical back: Neck supple.      Right lower leg: No edema.      Left lower leg: No edema.      Comments: Generalized weakness   Psychiatric:         Mood and Affect: Mood normal.         Behavior: Behavior is cooperative.         Assessment/Plan   Problem List Items Addressed This Visit       End stage renal disease (CMS/[facility])     HD per nephrology  Tolerating tx  Renal diet         Hypertension, essential     BP at goal  Continue antihypertensives  Monitor BP         Weakness - Primary     Patient is nonambulatory  Continue working with therapy           Medications, treatments, and labs reviewed  Continue medications and treatments as listed in EMR    Scribe Attestation  I, Melissa Storey   attest that this documentation has been prepared under the direction and in the presence of JHON Garcia    Provider Attestation - Scribe documentation  All medical record entries made by the Scribe were at my direction and personally dictated by me. I have reviewed the chart and agree that the record accurately reflects my personal performance of the history, physical exam, discussion and plan.   JOHN Garcia

## 2023-09-05 NOTE — PROGRESS NOTES
PROGRESS NOTE    Subjective   Chief complaint: Afua Hilario is a 83 y.o. female who is a acute skilled care patient being seen and evaluated for weakness.    HPI:  8/4/23  Patient was presented to the ED with abdominal pain. Patient was given IV fluids and started on broad spectrum antibiotics. CT abdomen pelvis showed possible changes of pancreatitis. Chest x-ray showed bilateral basilar opacities. Pt was intubated in the MICU. Pt was weaned to room air. Patient was able to be transferred out of MICU and was stable in RNF. Wound care was ordered for decubitus ulcer. Patient was discharged to skilled nursing facility.     8/7/23  Patient with PMH of HTN, rheumatoid arthritis, anemia and GERD admitted to SNF for therapy d/t weakness after recent hospitalization.  Patient requires assist with ADLs and transfers.  She is total dependent for mobility.  Therapy to eval and treat.  Patient is stable and has no new complaints at this time.  No new concerns reported by nursing.      8/8/23  Patient continues to work in therapy due to weakness and debility. Requires assistance for transfers and Adl's. Patient with ESRD continues on HD and tolerating well. No new issues at this time. No acute distress.     8/9/23  Patient in therapy d/t generalized weakness.  Patient presents for f/u.  Therapy is working with patient on swallowing, bed mobility and ADLs.  No new concerns reported by nursing.      8/10/23   Patient skilled for therapy due to weakness  is working in therapy to improve gross motor coordination, bed mobility and transfer training.  Patient is stable.  Denies n/v/f/c.  No new concerns reported by nursing.    8/11/23  Patient continues to work in therapy due to weakness and debility. Requires   Moderate assistance for transfers and Adl's. Patient with ESRD continues on HD and tolerating well. No new issues at this time. No acute distress.     8/14/23  Patient skilled for ST.  Is total dependent for all care.   ST attempted analysis of tolerance of ice chips.  Patient became agitated and refused.  Unable to answer simple yes/no questions.  No new concerns reported by nursing.      8/15/23  Patient presents for general medical care and f/u.  Patient seen and examined at bedside.  No issues per nursing.  Patient has no acute complaints.  Patient continues to work in therapy due to weakness and debility.  Requires assistance for transfers ADLs and mobility.  HTN BP at goal.  Denies chest pain and headache.   anemia stable, denies fatigue, sob, and palpitations.   Patient with ESRD continues on HD tolerating it well.  No acute distress.    8/16/23   Patient continues to working in PT/OT/ST.  Patient is stable without complaint.  Tolerating tube feed.  Denies n/v/f/c.  No new concerns reported by staff.        Objective   Vital signs:   20, 147/88, 98.0, 74, 98%  Physical Exam  Vitals reviewed.   Constitutional:       General: She is not in acute distress.  Cardiovascular:      Rate and Rhythm: Normal rate and regular rhythm.   Pulmonary:      Effort: Pulmonary effort is normal.      Breath sounds: Normal breath sounds.   Abdominal:      Comments: NG tube   Musculoskeletal:      Cervical back: Neck supple.      Right lower leg: No edema.      Left lower leg: No edema.      Comments: Generalized weakness   Psychiatric:         Behavior: Behavior is cooperative.         Assessment/Plan   Problem List Items Addressed This Visit       End stage renal disease (CMS/[facility])     HD per nephrology  Tolerating tx  Renal diet         Hypertension, essential     Continue antihypertensives  Monitor BP         Weakness - Primary     Patient is nonambulatory  Requires wheelchair for mobility  Continue working with therapy           Medications, treatments, and labs reviewed  Continue medications and treatments as listed in EMR    Scribe Attestation  I, Melissa Storey   attest that this documentation has been prepared under the direction and  in the presence of JOHN Garcia    Provider Attestation - Scribe documentation  All medical record entries made by the Scribe were at my direction and personally dictated by me. I have reviewed the chart and agree that the record accurately reflects my personal performance of the history, physical exam, discussion and plan.   JOHN Garcia

## 2023-10-02 NOTE — LETTER
Patient: Afua Hilario  : 1940    Encounter Date: 10/02/2023    PROGRESS NOTE    Subjective  Chief complaint: Afua Hilario is a 83 y.o. female who is a acute skilled care patient being seen and evaluated for weakness.    HPI:  HPI   Patient with PMH of HTN, ESRD, CKD, dementia, necrotizing fasciitis, dysphagia and anemia admitted to SNF for therapy d/t weakness after recent hospitalization for hypotension.   Patient nonverbal and total dependent for all care.  No new concerns reported by nursing.      Objective  Vital signs:  18, 91/59, 97.3, 56, 96%, BS 73  Physical Exam  Constitutional:       General: She is not in acute distress.     Comments: NG tube  Nonverbal   Eyes:      Extraocular Movements: Extraocular movements intact.   Cardiovascular:      Rate and Rhythm: Normal rate and regular rhythm.   Pulmonary:      Effort: Pulmonary effort is normal.      Breath sounds: Normal breath sounds.   Abdominal:      General: Bowel sounds are normal.      Palpations: Abdomen is soft.   Musculoskeletal:      Cervical back: Neck supple.      Right lower leg: No edema.      Left lower leg: No edema.      Comments: Generalized weakness   Neurological:      Mental Status: She is alert.         Assessment/Plan  Problem List Items Addressed This Visit       Dementia (CMS/HCC)     Speech therapy to eval and treat         End stage renal disease (CMS/[facility])     HD per nephrology         Hypertension, essential     Monitor BP         Malnutrition (CMS/[facility])     NG tube         Weakness - Primary     Patient is nonambulatory  Requires wheelchair for mobility  Therapy to eval and treat          Medications, treatments, and labs reviewed  Continue medications and treatments as listed in EMR    Scribe Attestation  Christi KWOK Scribe   attest that this documentation has been prepared under the direction and in the presence of BUBBA Garcia-CNP    Provider Attestation - Scribe documentation  All medical record  entries made by the Scribe were at my direction and personally dictated by me. I have reviewed the chart and agree that the record accurately reflects my personal performance of the history, physical exam, discussion and plan.   JOHN Garcia        Electronically Signed By: JOHN Garcia   10/8/23  1:15 PM

## 2023-10-03 NOTE — LETTER
Patient: Afua Hilario  : 1940    Encounter Date: 10/03/2023    PROGRESS NOTE    Subjective  Chief complaint: Afua Hilario is a 83 y.o. female who is a acute skilled care patient being seen and evaluated for weakness.    HPI:  HPI  Patient is working in therapy due to weakness.  Working on strengthening exercises/activities, gait training, transfers and ADLs to increase independence and improve functional mobility.  Patient is non-verbal.  No new concerns reported by staff.    Objective  Vital signs:  18, 86/62, 96.6, 85, 97%,   Physical Exam  Constitutional:       General: She is not in acute distress.     Comments: NG tube   Eyes:      Extraocular Movements: Extraocular movements intact.   Cardiovascular:      Rate and Rhythm: Normal rate and regular rhythm.   Pulmonary:      Effort: Pulmonary effort is normal.      Breath sounds: Normal breath sounds.   Musculoskeletal:      Cervical back: Neck supple.      Right lower leg: No edema.      Left lower leg: No edema.      Comments: Generalized weakness   Neurological:      Mental Status: She is alert.         Assessment/Plan  Problem List Items Addressed This Visit       End stage renal disease (CMS/[facility])     HD per nephrology  Tolerating tx         Hypertension, essential     Continue antihypertensives  Monitor BP         Weakness - Primary     Patient is nonambulatory  Requires wheelchair for mobility  Continue working with therapy           Medications, treatments, and labs reviewed  Continue medications and treatments as listed in EMR    Scribe Attestation  IChristi Scribe   attest that this documentation has been prepared under the direction and in the presence of BUBBA Garcia-CNP    Provider Attestation - Scribe documentation  All medical record entries made by the Scribe were at my direction and personally dictated by me. I have reviewed the chart and agree that the record accurately reflects my personal performance of  the history, physical exam, discussion and plan.   JOHN Garcia        Electronically Signed By: JOHN Garcia   10/8/23  9:06 AM

## 2023-10-03 NOTE — PROGRESS NOTES
Therapy update:  10/2/23: PT eval completed via telehelth with use of facilitator, pt gave both written and verbal consent, reviewed goals and current status

## 2023-10-04 PROBLEM — G93.41 METABOLIC ENCEPHALOPATHY: Status: ACTIVE | Noted: 2023-01-01

## 2023-10-04 NOTE — ED PROVIDER NOTES
HPI   Chief Complaint   Patient presents with    Fall     PT FOUND LAYING IN VOMIT ON FLOOR AT FACILITY-UNKNOWN DOWNTIME       This is an 83-year-old female past medical history of ESRD on hemodialysis, dementia, anemia of chronic disease, RA, hypertension, GERD, CAD who presents to the emergency department after a fall.  Per facility she was found vomit on her.  She is not responsive which the facility states is her baseline.  Per EMS patient was stable.  Upon entering the room patient is not responding concern patient is not protecting their airway and we will move forward with intubation.  No signs of trauma patient's head.  Abdomen soft nontender.  She is gurgling.                          No data recorded                Patient History   Past Medical History:   Diagnosis Date    Anemia     ESRD (end stage renal disease) (CMS/[facility])     GERD (gastroesophageal reflux disease)     Hypertension, essential     Malnutrition (CMS/[facility])     RA (rheumatoid arthritis) (CMS/[facility])      No past surgical history on file.  Family History   Problem Relation Name Age of Onset    No Known Problems Mother      No Known Problems Father       Social History     Tobacco Use    Smoking status: Not on file    Smokeless tobacco: Not on file   Substance Use Topics    Alcohol use: Not on file    Drug use: Not on file       Physical Exam   ED Triage Vitals [10/04/23 1045]   Temp Heart Rate Resp BP   35.8 °C (96.4 °F) 66 20 106/80      SpO2 Temp Source Heart Rate Source Patient Position   -- Tympanic Monitor --      BP Location FiO2 (%)     Left arm --       Physical Exam  Constitutional:       General: She is in acute distress.      Appearance: She is ill-appearing.   HENT:      Head: Normocephalic and atraumatic.      Right Ear: Tympanic membrane normal.      Left Ear: Tympanic membrane normal.      Mouth/Throat:      Mouth: Mucous membranes are moist.   Eyes:      Conjunctiva/sclera: Conjunctivae normal.      Pupils: Pupils are equal,  round, and reactive to light.   Cardiovascular:      Rate and Rhythm: Regular rhythm. Bradycardia present.      Heart sounds: No murmur heard.  Pulmonary:      Effort: No respiratory distress.      Breath sounds: No stridor. Rhonchi present. No wheezing or rales.   Abdominal:      General: Bowel sounds are normal. There is no distension.      Tenderness: There is no abdominal tenderness. There is no guarding or rebound.   Musculoskeletal:         General: No swelling, tenderness or deformity. Normal range of motion.   Skin:     General: Skin is warm and dry.      Coloration: Skin is not jaundiced.      Findings: No bruising or lesion.   Neurological:      Comments: Patient is not responding does not appear to be protecting her airway.       Labs Reviewed - No data to display  No orders to display       ED Course & MDM        Medical Decision Making  83-year-old female presents emerged department for a fall and having episodes of vomiting.  Here there is concern the patient not protecting her airway.  Patient is being suctioned.  She had cardiac arrest and went into PEA prior to us being able to intubate.  Patient was intubated.  There is fluid coming from the ET tube.  Patient was given 3 doses of epi, 2 calcium and 1 of bicarb for concern of potential hypokalemia.  She does not appear to be hypothermic.  All other reversible conditions were addressed.  Patient on repeat pulse checks was in asystole.  She was in cardiac arrest for 20 minutes with definitive airway.  All reversible causes were addressed.  Time of death was called at 1107.  Family was updated.        Procedure  Intubation    Performed by: Lea Quevedo MD  Authorized by: Lea Quevedo MD    Consent:     Consent obtained:  Emergent situation  Successful intubation attempt details:     Intubation method:  Oral    Intubation technique: video assisted      Laryngoscope blade:  Hypercurved and Mac 3    Bougie used: no      Grade view: I       Tube size (mm):  7.5    Tube type:  Cuffed    Tube visualized through cords: yes    Placement assessment:     Tube secured with:  ETT lawrence    Breath sounds:  Equal    Placement verification: chest rise and colorimetric ETCO2         Lea Quevedo MD  10/04/23 5912

## 2023-10-04 NOTE — LETTER
Patient: Afua Hilario  : 1940    Encounter Date: 10/04/2023    HISTORY & PHYSICAL    Subjective  Chief complaint: Afua Hilario is a 83 y.o. female who is a acute skilled care patient being seen and evaluated for multiple medical problems.  Patient presents for weakness massive wound in her pelvic and.  Area    HPI:  Patient is a 83 year old female with a past medical history of dementia, dysphagia, ESRD, and anemia. Patient was admitted to the skilled nursing facility after being in the hospital. Patient was presented to the hospital for hypotension and shock. Patient was unresponsive to verbal commands. Patient was treated and evaluated by PT/OT and discharged to SNF.   akness.    HPI:  Patient is a 83 year old female with a past medical history of dementia, dysphagia, ESRD, and anemia. Patient was admitted to the skilled nursing facility after being in the hospital. Patient was presented to the hospital for hypotension and shock. Patient was unresponsive to verbal commands. Patient was treated for the septic shock after stabilization and improvement she was  evaluated by PT/OT and discharged to SNF for rehab and wound care.         Past Medical History:   Diagnosis Date   • Anemia    • ESRD (end stage renal disease) (CMS/[facility])    • GERD (gastroesophageal reflux disease)    • Hypertension, essential    • Malnutrition (CMS/[facility])    • RA (rheumatoid arthritis) (CMS/[facility])        No past surgical history on file.    Family History   Problem Relation Name Age of Onset   • No Known Problems Mother     • No Known Problems Father         Social History     Socioeconomic History   • Marital status:      Spouse name: Not on file   • Number of children: Not on file   • Years of education: Not on file   • Highest education level: Not on file   Occupational History   • Not on file   Tobacco Use   • Smoking status: Not on file   • Smokeless tobacco: Not on file   Substance and Sexual Activity   • Alcohol  use: Not on file   • Drug use: Not on file   • Sexual activity: Not on file   Other Topics Concern   • Not on file   Social History Narrative   • Not on file     Social Determinants of Health     Financial Resource Strain: Not on file   Food Insecurity: Not on file   Transportation Needs: Not on file   Physical Activity: Not on file   Stress: Not on file   Social Connections: Not on file   Intimate Partner Violence: Not on file   Housing Stability: Not on file       Vital signs: 110/58, 98, 77, 20, 358.0, 95%    Objective  Physical Exam  Vitals reviewed.   HENT:      Head: Normocephalic and atraumatic.   Cardiovascular:      Rate and Rhythm: Normal rate and regular rhythm.   Pulmonary:      Effort: Pulmonary effort is normal.      Breath sounds: Normal breath sounds.   Abdominal:      General: Bowel sounds are normal.      Palpations: Abdomen is soft.   Musculoskeletal:      Cervical back: Neck supple.   Skin:     General: Skin is warm and dry.      Comments: Large extensive wound in the periarea covered with a dressing   Neurological:      Comments: Lethargic with generalized weakness   Psychiatric:         Mood and Affect: Mood normal.         Behavior: Behavior is cooperative.         Assessment/Plan  Problem List Items Addressed This Visit       End stage renal disease (CMS/HCC)    Necrotizing fasciitis (CMS/HCC) - Primary    Anemia due to chronic kidney disease    Hypertension, essential    Weakness    Metabolic encephalopathy     Medications, treatments, and labs reviewed  Continue medications and treatments as listed in PCC    Scribe Attestation  I, Melissa Barajas   attest that this documentation has been prepared under the direction and in the presence of Sridevi Mitchell MD.    Provider Attestation - Scribe documentation  All medical record entries made by the Scribe were at my direction and personally dictated by me. I have reviewed the chart and agree that the record accurately reflects my personal  performance of the history, physical exam, discussion and plan.    Sridevi Mitchell MD          Electronically Signed By: Sridevi Mitchell MD   10/4/23  6:00 PM

## 2023-10-04 NOTE — PROGRESS NOTES
HISTORY & PHYSICAL    Subjective   Chief complaint: Afua Hilario is a 83 y.o. female who is a acute skilled care patient being seen and evaluated for multiple medical problems.  Patient presents for weakness massive wound in her pelvic and.  Area    HPI:  Patient is a 83 year old female with a past medical history of dementia, dysphagia, ESRD, and anemia. Patient was admitted to the skilled nursing facility after being in the hospital. Patient was presented to the hospital for hypotension and shock. Patient was unresponsive to verbal commands. Patient was treated and evaluated by PT/OT and discharged to SNF.   akness.    HPI:  Patient is a 83 year old female with a past medical history of dementia, dysphagia, ESRD, and anemia. Patient was admitted to the skilled nursing facility after being in the hospital. Patient was presented to the hospital for hypotension and shock. Patient was unresponsive to verbal commands. Patient was treated for the septic shock after stabilization and improvement she was  evaluated by PT/OT and discharged to SNF for rehab and wound care.         Past Medical History:   Diagnosis Date    Anemia     ESRD (end stage renal disease) (CMS/[facility])     GERD (gastroesophageal reflux disease)     Hypertension, essential     Malnutrition (CMS/[facility])     RA (rheumatoid arthritis) (CMS/[facility])        No past surgical history on file.    Family History   Problem Relation Name Age of Onset    No Known Problems Mother      No Known Problems Father         Social History     Socioeconomic History    Marital status:      Spouse name: Not on file    Number of children: Not on file    Years of education: Not on file    Highest education level: Not on file   Occupational History    Not on file   Tobacco Use    Smoking status: Not on file    Smokeless tobacco: Not on file   Substance and Sexual Activity    Alcohol use: Not on file    Drug use: Not on file    Sexual activity: Not on file   Other Topics  Concern    Not on file   Social History Narrative    Not on file     Social Determinants of Health     Financial Resource Strain: Not on file   Food Insecurity: Not on file   Transportation Needs: Not on file   Physical Activity: Not on file   Stress: Not on file   Social Connections: Not on file   Intimate Partner Violence: Not on file   Housing Stability: Not on file       Vital signs: 110/58, 98, 77, 20, 358.0, 95%    Objective   Physical Exam  Vitals reviewed.   HENT:      Head: Normocephalic and atraumatic.   Cardiovascular:      Rate and Rhythm: Normal rate and regular rhythm.   Pulmonary:      Effort: Pulmonary effort is normal.      Breath sounds: Normal breath sounds.   Abdominal:      General: Bowel sounds are normal.      Palpations: Abdomen is soft.   Musculoskeletal:      Cervical back: Neck supple.   Skin:     General: Skin is warm and dry.      Comments: Large extensive wound in the periarea covered with a dressing   Neurological:      Comments: Lethargic with generalized weakness   Psychiatric:         Mood and Affect: Mood normal.         Behavior: Behavior is cooperative.         Assessment/Plan   Problem List Items Addressed This Visit       End stage renal disease (CMS/HCC)    Necrotizing fasciitis (CMS/HCC) - Primary    Anemia due to chronic kidney disease    Hypertension, essential    Weakness    Metabolic encephalopathy     Medications, treatments, and labs reviewed  Continue medications and treatments as listed in [facility]    Scribe Attestation  I, Raine Mckeon Scriblaura   attest that this documentation has been prepared under the direction and in the presence of Sridevi Micthell MD.    Provider Attestation - Scribe documentation  All medical record entries made by the Scribe were at my direction and personally dictated by me. I have reviewed the chart and agree that the record accurately reflects my personal performance of the history, physical exam, discussion and plan.    Sridevi Mitchell,  MD

## 2023-10-06 NOTE — PROGRESS NOTES
PROGRESS NOTE    Subjective   Chief complaint: Afua Hilario is a 83 y.o. female who is a acute skilled care patient being seen and evaluated for weakness.    HPI:  HPI   Patient with PMH of HTN, ESRD, CKD, dementia, necrotizing fasciitis, dysphagia and anemia admitted to SNF for therapy d/t weakness after recent hospitalization for hypotension.   Patient nonverbal and total dependent for all care.  No new concerns reported by nursing.      Objective   Vital signs:  18, 91/59, 97.3, 56, 96%, BS 73  Physical Exam  Constitutional:       General: She is not in acute distress.     Comments: NG tube  Nonverbal   Eyes:      Extraocular Movements: Extraocular movements intact.   Cardiovascular:      Rate and Rhythm: Normal rate and regular rhythm.   Pulmonary:      Effort: Pulmonary effort is normal.      Breath sounds: Normal breath sounds.   Abdominal:      General: Bowel sounds are normal.      Palpations: Abdomen is soft.   Musculoskeletal:      Cervical back: Neck supple.      Right lower leg: No edema.      Left lower leg: No edema.      Comments: Generalized weakness   Neurological:      Mental Status: She is alert.         Assessment/Plan   Problem List Items Addressed This Visit       Dementia (CMS/HCC)     Speech therapy to eval and treat         End stage renal disease (CMS/[facility])     HD per nephrology         Hypertension, essential     Monitor BP         Malnutrition (CMS/HCC)     NG tube         Weakness - Primary     Patient is nonambulatory  Requires wheelchair for mobility  Therapy to eval and treat          Medications, treatments, and labs reviewed  Continue medications and treatments as listed in EMR    Scribe Attestation  Christi KWOK Scribe   attest that this documentation has been prepared under the direction and in the presence of BUBBA Garcia-CNP    Provider Attestation - Scribe documentation  All medical record entries made by the Scribe were at my direction and personally  dictated by me. I have reviewed the chart and agree that the record accurately reflects my personal performance of the history, physical exam, discussion and plan.   Heather Sierra, APRN-CNP

## 2023-10-06 NOTE — PROGRESS NOTES
PROGRESS NOTE    Subjective   Chief complaint: Afua Hilario is a 83 y.o. female who is a acute skilled care patient being seen and evaluated for weakness.    HPI:  HPI  Patient is working in therapy due to weakness.  Working on strengthening exercises/activities, gait training, transfers and ADLs to increase independence and improve functional mobility.  Patient is non-verbal.  No new concerns reported by staff.    Objective   Vital signs:  18, 86/62, 96.6, 85, 97%,   Physical Exam  Constitutional:       General: She is not in acute distress.     Comments: NG tube   Eyes:      Extraocular Movements: Extraocular movements intact.   Cardiovascular:      Rate and Rhythm: Normal rate and regular rhythm.   Pulmonary:      Effort: Pulmonary effort is normal.      Breath sounds: Normal breath sounds.   Musculoskeletal:      Cervical back: Neck supple.      Right lower leg: No edema.      Left lower leg: No edema.      Comments: Generalized weakness   Neurological:      Mental Status: She is alert.         Assessment/Plan   Problem List Items Addressed This Visit       End stage renal disease (CMS/HCC)     HD per nephrology  Tolerating tx         Hypertension, essential     Continue antihypertensives  Monitor BP         Weakness - Primary     Patient is nonambulatory  Requires wheelchair for mobility  Continue working with therapy           Medications, treatments, and labs reviewed  Continue medications and treatments as listed in EMR    Scribe Attestation  IChristi Scribe   attest that this documentation has been prepared under the direction and in the presence of BUBBA Garcia-CNP    Provider Attestation - Scribe documentation  All medical record entries made by the Scribe were at my direction and personally dictated by me. I have reviewed the chart and agree that the record accurately reflects my personal performance of the history, physical exam, discussion and plan.   Heather Sierra,  APRN-CNP

## 2023-10-08 PROBLEM — E46 MALNUTRITION (MULTI): Status: ACTIVE | Noted: 2023-10-08

## 2023-10-08 PROBLEM — F03.90 DEMENTIA (MULTI): Status: ACTIVE | Noted: 2023-10-08
